# Patient Record
Sex: FEMALE | Race: BLACK OR AFRICAN AMERICAN | Employment: UNEMPLOYED | URBAN - METROPOLITAN AREA
[De-identification: names, ages, dates, MRNs, and addresses within clinical notes are randomized per-mention and may not be internally consistent; named-entity substitution may affect disease eponyms.]

---

## 2019-03-19 ENCOUNTER — HOSPITAL ENCOUNTER (INPATIENT)
Age: 84
LOS: 3 days | Discharge: HOME | DRG: 309 | End: 2019-03-22
Attending: EMERGENCY MEDICINE | Admitting: INTERNAL MEDICINE
Payer: MEDICARE

## 2019-03-19 ENCOUNTER — APPOINTMENT (OUTPATIENT)
Dept: CT IMAGING | Age: 84
DRG: 309 | End: 2019-03-19
Payer: MEDICARE

## 2019-03-19 ENCOUNTER — APPOINTMENT (OUTPATIENT)
Dept: GENERAL RADIOLOGY | Age: 84
DRG: 309 | End: 2019-03-19
Payer: MEDICARE

## 2019-03-19 DIAGNOSIS — R00.1 BRADYCARDIA: ICD-10-CM

## 2019-03-19 DIAGNOSIS — R00.1 SYMPTOMATIC BRADYCARDIA: Primary | ICD-10-CM

## 2019-03-19 DIAGNOSIS — R55 SYNCOPE, UNSPECIFIED SYNCOPE TYPE: ICD-10-CM

## 2019-03-19 LAB
ANION GAP SERPL CALCULATED.3IONS-SCNC: 11 MMOL/L (ref 3–16)
BASOPHILS ABSOLUTE: 0 K/UL (ref 0–0.2)
BASOPHILS RELATIVE PERCENT: 0.5 %
BILIRUBIN URINE: NEGATIVE
BLOOD, URINE: NEGATIVE
BUN BLDV-MCNC: 16 MG/DL (ref 7–20)
CALCIUM SERPL-MCNC: 9.6 MG/DL (ref 8.3–10.6)
CHLORIDE BLD-SCNC: 104 MMOL/L (ref 99–110)
CLARITY: CLEAR
CO2: 31 MMOL/L (ref 21–32)
COLOR: YELLOW
CREAT SERPL-MCNC: 0.7 MG/DL (ref 0.6–1.2)
EKG ATRIAL RATE: 80 BPM
EKG DIAGNOSIS: NORMAL
EKG P AXIS: 26 DEGREES
EKG P-R INTERVAL: 182 MS
EKG Q-T INTERVAL: 404 MS
EKG QRS DURATION: 118 MS
EKG QTC CALCULATION (BAZETT): 465 MS
EKG R AXIS: -35 DEGREES
EKG T AXIS: 84 DEGREES
EKG VENTRICULAR RATE: 80 BPM
EOSINOPHILS ABSOLUTE: 0.4 K/UL (ref 0–0.6)
EOSINOPHILS RELATIVE PERCENT: 6.4 %
GFR AFRICAN AMERICAN: >60
GFR NON-AFRICAN AMERICAN: >60
GLUCOSE BLD-MCNC: 138 MG/DL (ref 70–99)
GLUCOSE URINE: NEGATIVE MG/DL
HCT VFR BLD CALC: 43.9 % (ref 36–48)
HEMOGLOBIN: 14.4 G/DL (ref 12–16)
KETONES, URINE: NEGATIVE MG/DL
LEUKOCYTE ESTERASE, URINE: NEGATIVE
LYMPHOCYTES ABSOLUTE: 1.5 K/UL (ref 1–5.1)
LYMPHOCYTES RELATIVE PERCENT: 25.5 %
MAGNESIUM: 1.9 MG/DL (ref 1.8–2.4)
MCH RBC QN AUTO: 30.8 PG (ref 26–34)
MCHC RBC AUTO-ENTMCNC: 32.8 G/DL (ref 31–36)
MCV RBC AUTO: 93.8 FL (ref 80–100)
MICROSCOPIC EXAMINATION: NORMAL
MONOCYTES ABSOLUTE: 0.6 K/UL (ref 0–1.3)
MONOCYTES RELATIVE PERCENT: 9.3 %
NEUTROPHILS ABSOLUTE: 3.5 K/UL (ref 1.7–7.7)
NEUTROPHILS RELATIVE PERCENT: 58.3 %
NITRITE, URINE: NEGATIVE
PDW BLD-RTO: 13.7 % (ref 12.4–15.4)
PH UA: 7 (ref 5–8)
PLATELET # BLD: 232 K/UL (ref 135–450)
PMV BLD AUTO: 8.4 FL (ref 5–10.5)
POTASSIUM REFLEX MAGNESIUM: 3.5 MMOL/L (ref 3.5–5.1)
PROTEIN UA: NEGATIVE MG/DL
RBC # BLD: 4.68 M/UL (ref 4–5.2)
SODIUM BLD-SCNC: 146 MMOL/L (ref 136–145)
SPECIFIC GRAVITY UA: 1.01 (ref 1–1.03)
TROPONIN: <0.01 NG/ML
URINE REFLEX TO CULTURE: NORMAL
URINE TYPE: NORMAL
UROBILINOGEN, URINE: 1 E.U./DL
WBC # BLD: 6 K/UL (ref 4–11)

## 2019-03-19 PROCEDURE — 93005 ELECTROCARDIOGRAM TRACING: CPT | Performed by: PHYSICIAN ASSISTANT

## 2019-03-19 PROCEDURE — 2060000000 HC ICU INTERMEDIATE R&B

## 2019-03-19 PROCEDURE — 81003 URINALYSIS AUTO W/O SCOPE: CPT

## 2019-03-19 PROCEDURE — 99285 EMERGENCY DEPT VISIT HI MDM: CPT

## 2019-03-19 PROCEDURE — 93010 ELECTROCARDIOGRAM REPORT: CPT | Performed by: INTERNAL MEDICINE

## 2019-03-19 PROCEDURE — 83735 ASSAY OF MAGNESIUM: CPT

## 2019-03-19 PROCEDURE — 71045 X-RAY EXAM CHEST 1 VIEW: CPT

## 2019-03-19 PROCEDURE — 6370000000 HC RX 637 (ALT 250 FOR IP): Performed by: PHYSICIAN ASSISTANT

## 2019-03-19 PROCEDURE — 85025 COMPLETE CBC W/AUTO DIFF WBC: CPT

## 2019-03-19 PROCEDURE — 84484 ASSAY OF TROPONIN QUANT: CPT

## 2019-03-19 PROCEDURE — 80048 BASIC METABOLIC PNL TOTAL CA: CPT

## 2019-03-19 RX ORDER — POTASSIUM CHLORIDE 750 MG/1
10 CAPSULE, EXTENDED RELEASE ORAL 2 TIMES DAILY
COMMUNITY

## 2019-03-19 RX ORDER — HYDROCHLOROTHIAZIDE 25 MG/1
25 TABLET ORAL DAILY
Status: DISCONTINUED | OUTPATIENT
Start: 2019-03-20 | End: 2019-03-22 | Stop reason: HOSPADM

## 2019-03-19 RX ORDER — HYDROCHLOROTHIAZIDE 25 MG/1
25 TABLET ORAL ONCE
Status: COMPLETED | OUTPATIENT
Start: 2019-03-19 | End: 2019-03-19

## 2019-03-19 RX ORDER — AMLODIPINE BESYLATE 5 MG/1
5 TABLET ORAL ONCE
Status: COMPLETED | OUTPATIENT
Start: 2019-03-19 | End: 2019-03-19

## 2019-03-19 RX ORDER — MIRTAZAPINE 15 MG/1
7.5 TABLET, FILM COATED ORAL NIGHTLY
Status: CANCELLED | OUTPATIENT
Start: 2019-03-19

## 2019-03-19 RX ORDER — PRAVASTATIN SODIUM 10 MG
20 TABLET ORAL EVERY MORNING
Status: DISCONTINUED | OUTPATIENT
Start: 2019-03-20 | End: 2019-03-22 | Stop reason: HOSPADM

## 2019-03-19 RX ORDER — AMLODIPINE BESYLATE 5 MG/1
5 TABLET ORAL DAILY
Status: DISCONTINUED | OUTPATIENT
Start: 2019-03-20 | End: 2019-03-22 | Stop reason: HOSPADM

## 2019-03-19 RX ORDER — ATORVASTATIN CALCIUM 10 MG/1
20 TABLET, FILM COATED ORAL DAILY
Status: CANCELLED | OUTPATIENT
Start: 2019-03-19

## 2019-03-19 RX ORDER — ASPIRIN 81 MG/1
81 TABLET, CHEWABLE ORAL DAILY
Status: DISCONTINUED | OUTPATIENT
Start: 2019-03-20 | End: 2019-03-22 | Stop reason: HOSPADM

## 2019-03-19 RX ORDER — MIRTAZAPINE 7.5 MG/1
7.5 TABLET, FILM COATED ORAL NIGHTLY
COMMUNITY
End: 2019-03-19

## 2019-03-19 RX ADMIN — HYDROCHLOROTHIAZIDE 25 MG: 25 TABLET ORAL at 10:20

## 2019-03-19 RX ADMIN — METOPROLOL TARTRATE 25 MG: 25 TABLET ORAL at 10:20

## 2019-03-19 RX ADMIN — AMLODIPINE BESYLATE 5 MG: 5 TABLET ORAL at 10:20

## 2019-03-20 LAB — TSH SERPL DL<=0.05 MIU/L-ACNC: 1.76 UIU/ML (ref 0.27–4.2)

## 2019-03-20 PROCEDURE — 99223 1ST HOSP IP/OBS HIGH 75: CPT | Performed by: INTERNAL MEDICINE

## 2019-03-20 PROCEDURE — 6370000000 HC RX 637 (ALT 250 FOR IP): Performed by: INTERNAL MEDICINE

## 2019-03-20 PROCEDURE — 93010 ELECTROCARDIOGRAM REPORT: CPT | Performed by: INTERNAL MEDICINE

## 2019-03-20 PROCEDURE — 36415 COLL VENOUS BLD VENIPUNCTURE: CPT

## 2019-03-20 PROCEDURE — 2060000000 HC ICU INTERMEDIATE R&B

## 2019-03-20 PROCEDURE — 84443 ASSAY THYROID STIM HORMONE: CPT

## 2019-03-20 PROCEDURE — 93005 ELECTROCARDIOGRAM TRACING: CPT | Performed by: INTERNAL MEDICINE

## 2019-03-20 RX ORDER — HYDRALAZINE HYDROCHLORIDE 25 MG/1
25 TABLET, FILM COATED ORAL EVERY 8 HOURS SCHEDULED
Status: DISCONTINUED | OUTPATIENT
Start: 2019-03-20 | End: 2019-03-22 | Stop reason: HOSPADM

## 2019-03-20 RX ADMIN — PRAVASTATIN SODIUM 20 MG: 10 TABLET ORAL at 08:10

## 2019-03-20 RX ADMIN — AMLODIPINE BESYLATE 5 MG: 5 TABLET ORAL at 08:09

## 2019-03-20 RX ADMIN — ASPIRIN 81 MG 81 MG: 81 TABLET ORAL at 08:09

## 2019-03-20 RX ADMIN — HYDRALAZINE HYDROCHLORIDE 25 MG: 25 TABLET, FILM COATED ORAL at 21:26

## 2019-03-20 RX ADMIN — HYDRALAZINE HYDROCHLORIDE 25 MG: 25 TABLET, FILM COATED ORAL at 13:56

## 2019-03-20 RX ADMIN — HYDROCHLOROTHIAZIDE 25 MG: 25 TABLET ORAL at 08:10

## 2019-03-20 ASSESSMENT — PAIN SCALES - GENERAL: PAINLEVEL_OUTOF10: 0

## 2019-03-21 LAB
EKG ATRIAL RATE: 55 BPM
EKG DIAGNOSIS: NORMAL
EKG P AXIS: 40 DEGREES
EKG P-R INTERVAL: 178 MS
EKG Q-T INTERVAL: 434 MS
EKG QRS DURATION: 124 MS
EKG QTC CALCULATION (BAZETT): 415 MS
EKG R AXIS: -40 DEGREES
EKG T AXIS: 41 DEGREES
EKG VENTRICULAR RATE: 55 BPM
LV EF: 68 %
LVEF MODALITY: NORMAL

## 2019-03-21 PROCEDURE — 99232 SBSQ HOSP IP/OBS MODERATE 35: CPT | Performed by: INTERNAL MEDICINE

## 2019-03-21 PROCEDURE — 6370000000 HC RX 637 (ALT 250 FOR IP): Performed by: INTERNAL MEDICINE

## 2019-03-21 PROCEDURE — 2060000000 HC ICU INTERMEDIATE R&B

## 2019-03-21 PROCEDURE — 93306 TTE W/DOPPLER COMPLETE: CPT

## 2019-03-21 RX ADMIN — AMLODIPINE BESYLATE 5 MG: 5 TABLET ORAL at 09:16

## 2019-03-21 RX ADMIN — HYDRALAZINE HYDROCHLORIDE 25 MG: 25 TABLET, FILM COATED ORAL at 21:18

## 2019-03-21 RX ADMIN — HYDRALAZINE HYDROCHLORIDE 25 MG: 25 TABLET, FILM COATED ORAL at 13:07

## 2019-03-21 RX ADMIN — ASPIRIN 81 MG 81 MG: 81 TABLET ORAL at 09:16

## 2019-03-21 RX ADMIN — PRAVASTATIN SODIUM 20 MG: 10 TABLET ORAL at 09:22

## 2019-03-21 RX ADMIN — HYDROCHLOROTHIAZIDE 25 MG: 25 TABLET ORAL at 09:16

## 2019-03-21 RX ADMIN — HYDRALAZINE HYDROCHLORIDE 25 MG: 25 TABLET, FILM COATED ORAL at 07:03

## 2019-03-21 ASSESSMENT — PAIN SCALES - GENERAL
PAINLEVEL_OUTOF10: 0

## 2019-03-22 VITALS
OXYGEN SATURATION: 99 % | RESPIRATION RATE: 16 BRPM | BODY MASS INDEX: 32.44 KG/M2 | HEIGHT: 62 IN | SYSTOLIC BLOOD PRESSURE: 140 MMHG | TEMPERATURE: 97 F | DIASTOLIC BLOOD PRESSURE: 68 MMHG | WEIGHT: 176.28 LBS | HEART RATE: 70 BPM

## 2019-03-22 PROCEDURE — 97530 THERAPEUTIC ACTIVITIES: CPT

## 2019-03-22 PROCEDURE — 97535 SELF CARE MNGMENT TRAINING: CPT

## 2019-03-22 PROCEDURE — 97166 OT EVAL MOD COMPLEX 45 MIN: CPT

## 2019-03-22 PROCEDURE — 97530 THERAPEUTIC ACTIVITIES: CPT | Performed by: PHYSICAL THERAPIST

## 2019-03-22 PROCEDURE — 6370000000 HC RX 637 (ALT 250 FOR IP): Performed by: INTERNAL MEDICINE

## 2019-03-22 PROCEDURE — 94760 N-INVAS EAR/PLS OXIMETRY 1: CPT

## 2019-03-22 PROCEDURE — 97116 GAIT TRAINING THERAPY: CPT | Performed by: PHYSICAL THERAPIST

## 2019-03-22 PROCEDURE — 97162 PT EVAL MOD COMPLEX 30 MIN: CPT | Performed by: PHYSICAL THERAPIST

## 2019-03-22 PROCEDURE — 99232 SBSQ HOSP IP/OBS MODERATE 35: CPT | Performed by: NURSE PRACTITIONER

## 2019-03-22 RX ORDER — HYDRALAZINE HYDROCHLORIDE 25 MG/1
25 TABLET, FILM COATED ORAL EVERY 8 HOURS SCHEDULED
Qty: 90 TABLET | Refills: 1 | Status: SHIPPED | OUTPATIENT
Start: 2019-03-22

## 2019-03-22 RX ADMIN — HYDROCHLOROTHIAZIDE 25 MG: 25 TABLET ORAL at 08:30

## 2019-03-22 RX ADMIN — PRAVASTATIN SODIUM 20 MG: 10 TABLET ORAL at 08:30

## 2019-03-22 RX ADMIN — ASPIRIN 81 MG 81 MG: 81 TABLET ORAL at 08:30

## 2019-03-22 RX ADMIN — HYDRALAZINE HYDROCHLORIDE 25 MG: 25 TABLET, FILM COATED ORAL at 05:56

## 2019-03-22 RX ADMIN — AMLODIPINE BESYLATE 5 MG: 5 TABLET ORAL at 08:30

## 2019-03-22 ASSESSMENT — PAIN SCALES - GENERAL
PAINLEVEL_OUTOF10: 0
PAINLEVEL_OUTOF10: 0

## 2019-03-25 ENCOUNTER — TELEPHONE (OUTPATIENT)
Dept: CARDIOLOGY CLINIC | Age: 84
End: 2019-03-25

## 2019-05-08 ENCOUNTER — APPOINTMENT (OUTPATIENT)
Dept: CT IMAGING | Age: 84
End: 2019-05-08
Payer: MEDICARE

## 2019-05-08 ENCOUNTER — HOSPITAL ENCOUNTER (OUTPATIENT)
Age: 84
Setting detail: OBSERVATION
Discharge: HOME | End: 2019-05-11
Attending: EMERGENCY MEDICINE | Admitting: INTERNAL MEDICINE
Payer: MEDICARE

## 2019-05-08 ENCOUNTER — APPOINTMENT (OUTPATIENT)
Dept: GENERAL RADIOLOGY | Age: 84
End: 2019-05-08
Payer: MEDICARE

## 2019-05-08 DIAGNOSIS — I63.9 CEREBROVASCULAR ACCIDENT (CVA), UNSPECIFIED MECHANISM (HCC): Primary | ICD-10-CM

## 2019-05-08 LAB
A/G RATIO: 1 (ref 1.1–2.2)
ALBUMIN SERPL-MCNC: 4.3 G/DL (ref 3.4–5)
ALP BLD-CCNC: 118 U/L (ref 40–129)
ALT SERPL-CCNC: 8 U/L (ref 10–40)
ANION GAP SERPL CALCULATED.3IONS-SCNC: 11 MMOL/L (ref 3–16)
AST SERPL-CCNC: 16 U/L (ref 15–37)
BASOPHILS ABSOLUTE: 0.1 K/UL (ref 0–0.2)
BASOPHILS RELATIVE PERCENT: 0.9 %
BILIRUB SERPL-MCNC: 0.4 MG/DL (ref 0–1)
BILIRUBIN URINE: NEGATIVE
BLOOD, URINE: NEGATIVE
BUN BLDV-MCNC: 15 MG/DL (ref 7–20)
CALCIUM SERPL-MCNC: 9.8 MG/DL (ref 8.3–10.6)
CHLORIDE BLD-SCNC: 101 MMOL/L (ref 99–110)
CLARITY: CLEAR
CO2: 27 MMOL/L (ref 21–32)
COLOR: YELLOW
CREAT SERPL-MCNC: 0.8 MG/DL (ref 0.6–1.2)
EKG ATRIAL RATE: 80 BPM
EKG DIAGNOSIS: NORMAL
EKG P AXIS: 35 DEGREES
EKG P-R INTERVAL: 180 MS
EKG Q-T INTERVAL: 396 MS
EKG QRS DURATION: 114 MS
EKG QTC CALCULATION (BAZETT): 456 MS
EKG R AXIS: -28 DEGREES
EKG T AXIS: 67 DEGREES
EKG VENTRICULAR RATE: 80 BPM
EOSINOPHILS ABSOLUTE: 0.4 K/UL (ref 0–0.6)
EOSINOPHILS RELATIVE PERCENT: 6.3 %
GFR AFRICAN AMERICAN: >60
GFR NON-AFRICAN AMERICAN: >60
GLOBULIN: 4.2 G/DL
GLUCOSE BLD-MCNC: 120 MG/DL (ref 70–99)
GLUCOSE BLD-MCNC: 122 MG/DL (ref 70–99)
GLUCOSE URINE: NEGATIVE MG/DL
HCT VFR BLD CALC: 40.4 % (ref 36–48)
HEMOGLOBIN: 13.4 G/DL (ref 12–16)
INR BLD: 1.02 (ref 0.86–1.14)
KETONES, URINE: NEGATIVE MG/DL
LEUKOCYTE ESTERASE, URINE: NEGATIVE
LYMPHOCYTES ABSOLUTE: 2 K/UL (ref 1–5.1)
LYMPHOCYTES RELATIVE PERCENT: 33.1 %
MCH RBC QN AUTO: 30.7 PG (ref 26–34)
MCHC RBC AUTO-ENTMCNC: 33.1 G/DL (ref 31–36)
MCV RBC AUTO: 92.7 FL (ref 80–100)
MICROSCOPIC EXAMINATION: NORMAL
MONOCYTES ABSOLUTE: 0.6 K/UL (ref 0–1.3)
MONOCYTES RELATIVE PERCENT: 9.4 %
NEUTROPHILS ABSOLUTE: 3.1 K/UL (ref 1.7–7.7)
NEUTROPHILS RELATIVE PERCENT: 50.3 %
NITRITE, URINE: NEGATIVE
PDW BLD-RTO: 13.5 % (ref 12.4–15.4)
PERFORMED ON: ABNORMAL
PH UA: 7.5 (ref 5–8)
PLATELET # BLD: 214 K/UL (ref 135–450)
PMV BLD AUTO: 8.9 FL (ref 5–10.5)
POTASSIUM REFLEX MAGNESIUM: 3.7 MMOL/L (ref 3.5–5.1)
PROTEIN UA: NEGATIVE MG/DL
PROTHROMBIN TIME: 11.6 SEC (ref 9.8–13)
RBC # BLD: 4.36 M/UL (ref 4–5.2)
SODIUM BLD-SCNC: 139 MMOL/L (ref 136–145)
SPECIFIC GRAVITY UA: 1.02 (ref 1–1.03)
TOTAL PROTEIN: 8.5 G/DL (ref 6.4–8.2)
TROPONIN: <0.01 NG/ML
TROPONIN: <0.01 NG/ML
URINE REFLEX TO CULTURE: NORMAL
URINE TYPE: NORMAL
UROBILINOGEN, URINE: 1 E.U./DL
WBC # BLD: 6.2 K/UL (ref 4–11)

## 2019-05-08 PROCEDURE — 2580000003 HC RX 258: Performed by: INTERNAL MEDICINE

## 2019-05-08 PROCEDURE — 80053 COMPREHEN METABOLIC PANEL: CPT

## 2019-05-08 PROCEDURE — 70498 CT ANGIOGRAPHY NECK: CPT

## 2019-05-08 PROCEDURE — 93010 ELECTROCARDIOGRAM REPORT: CPT | Performed by: INTERNAL MEDICINE

## 2019-05-08 PROCEDURE — 99285 EMERGENCY DEPT VISIT HI MDM: CPT

## 2019-05-08 PROCEDURE — 70496 CT ANGIOGRAPHY HEAD: CPT

## 2019-05-08 PROCEDURE — 93005 ELECTROCARDIOGRAM TRACING: CPT | Performed by: EMERGENCY MEDICINE

## 2019-05-08 PROCEDURE — G0378 HOSPITAL OBSERVATION PER HR: HCPCS

## 2019-05-08 PROCEDURE — 70450 CT HEAD/BRAIN W/O DYE: CPT

## 2019-05-08 PROCEDURE — 81003 URINALYSIS AUTO W/O SCOPE: CPT

## 2019-05-08 PROCEDURE — 85610 PROTHROMBIN TIME: CPT

## 2019-05-08 PROCEDURE — 84484 ASSAY OF TROPONIN QUANT: CPT

## 2019-05-08 PROCEDURE — 36415 COLL VENOUS BLD VENIPUNCTURE: CPT

## 2019-05-08 PROCEDURE — 94760 N-INVAS EAR/PLS OXIMETRY 1: CPT

## 2019-05-08 PROCEDURE — 71045 X-RAY EXAM CHEST 1 VIEW: CPT

## 2019-05-08 PROCEDURE — 96372 THER/PROPH/DIAG INJ SC/IM: CPT

## 2019-05-08 PROCEDURE — 2060000000 HC ICU INTERMEDIATE R&B

## 2019-05-08 PROCEDURE — 6360000002 HC RX W HCPCS: Performed by: INTERNAL MEDICINE

## 2019-05-08 PROCEDURE — 6370000000 HC RX 637 (ALT 250 FOR IP): Performed by: INTERNAL MEDICINE

## 2019-05-08 PROCEDURE — 85025 COMPLETE CBC W/AUTO DIFF WBC: CPT

## 2019-05-08 PROCEDURE — 6360000004 HC RX CONTRAST MEDICATION: Performed by: EMERGENCY MEDICINE

## 2019-05-08 RX ORDER — ACETAMINOPHEN 80 MG
TABLET,CHEWABLE ORAL
Status: COMPLETED
Start: 2019-05-08 | End: 2019-05-08

## 2019-05-08 RX ORDER — NITROGLYCERIN 0.4 MG/1
0.4 TABLET SUBLINGUAL EVERY 5 MIN PRN
Status: ON HOLD | COMMUNITY
End: 2019-05-11 | Stop reason: HOSPADM

## 2019-05-08 RX ORDER — ASPIRIN 81 MG/1
81 TABLET ORAL DAILY
Status: DISCONTINUED | OUTPATIENT
Start: 2019-05-08 | End: 2019-05-11 | Stop reason: HOSPADM

## 2019-05-08 RX ORDER — SODIUM CHLORIDE 0.9 % (FLUSH) 0.9 %
10 SYRINGE (ML) INJECTION PRN
Status: DISCONTINUED | OUTPATIENT
Start: 2019-05-08 | End: 2019-05-11 | Stop reason: HOSPADM

## 2019-05-08 RX ORDER — ONDANSETRON 2 MG/ML
4 INJECTION INTRAMUSCULAR; INTRAVENOUS EVERY 6 HOURS PRN
Status: DISCONTINUED | OUTPATIENT
Start: 2019-05-08 | End: 2019-05-11 | Stop reason: HOSPADM

## 2019-05-08 RX ORDER — MIRTAZAPINE 7.5 MG/1
7.5 TABLET, FILM COATED ORAL NIGHTLY
COMMUNITY

## 2019-05-08 RX ORDER — MIRTAZAPINE 15 MG/1
7.5 TABLET, FILM COATED ORAL NIGHTLY
Status: DISCONTINUED | OUTPATIENT
Start: 2019-05-08 | End: 2019-05-11 | Stop reason: HOSPADM

## 2019-05-08 RX ORDER — ATORVASTATIN CALCIUM 40 MG/1
40 TABLET, FILM COATED ORAL NIGHTLY
Status: DISCONTINUED | OUTPATIENT
Start: 2019-05-08 | End: 2019-05-11 | Stop reason: HOSPADM

## 2019-05-08 RX ORDER — AMLODIPINE BESYLATE 10 MG/1
10 TABLET ORAL DAILY
COMMUNITY

## 2019-05-08 RX ORDER — POTASSIUM CHLORIDE 750 MG/1
10 TABLET, FILM COATED, EXTENDED RELEASE ORAL 2 TIMES DAILY
Status: DISCONTINUED | OUTPATIENT
Start: 2019-05-08 | End: 2019-05-11 | Stop reason: HOSPADM

## 2019-05-08 RX ORDER — SODIUM CHLORIDE 0.9 % (FLUSH) 0.9 %
10 SYRINGE (ML) INJECTION EVERY 12 HOURS SCHEDULED
Status: DISCONTINUED | OUTPATIENT
Start: 2019-05-08 | End: 2019-05-11 | Stop reason: HOSPADM

## 2019-05-08 RX ADMIN — MIRTAZAPINE 7.5 MG: 15 TABLET, FILM COATED ORAL at 21:30

## 2019-05-08 RX ADMIN — IOPAMIDOL 75 ML: 755 INJECTION, SOLUTION INTRAVENOUS at 13:01

## 2019-05-08 RX ADMIN — ASPIRIN 81 MG: 81 TABLET, COATED ORAL at 18:49

## 2019-05-08 RX ADMIN — ENOXAPARIN SODIUM 40 MG: 40 INJECTION SUBCUTANEOUS at 21:31

## 2019-05-08 RX ADMIN — Medication: at 21:36

## 2019-05-08 RX ADMIN — Medication 10 ML: at 21:32

## 2019-05-08 NOTE — ED NOTES
Dr. Francisco Boateng at bedside. Pt. Still unable to ambulate with standby assist and using walker. Dr. Francisco Boateng educating pt. On TPA risks and benefits.       Meghan Nathan RN  05/08/19 1841

## 2019-05-08 NOTE — ED NOTES
Receipt of report confirmed with receiving nurse, updates given on pt status and plan of care. RN given opportunity to ask questions and verbalized understanding.  Pt. Transported to floor via stretcher at this time     Eddie Almanzar RN  05/08/19 9968

## 2019-05-08 NOTE — ED NOTES
Bed: 02  Expected date:   Expected time:   Means of arrival: [facility]  Comments:   [facility]  05/08/19 124

## 2019-05-08 NOTE — ED NOTES
A Stroke Alert was called prior to the squad arriving to the ED at 1234. Stroke Team was called at  and they called back at [phone] and spoke with Dr. Julian Spence.       Ryan Rodriguez  05/08/19 1855

## 2019-05-08 NOTE — ED NOTES
Dr. Monse Gong,  Stroke, to assess pt.    En route per Dr. Harmony Chau report     Matilde Gore, RN  05/08/19 7038

## 2019-05-08 NOTE — H&P
HOSPITALISTS HISTORY AND PHYSICAL    5/8/2019 3:57 PM    Patient Information:  Eveline Rae is a 80 y.o. female [id number]  PCP:  Chelly Mayer (Tel: [phone] )    Chief complaint:    Chief Complaint   Patient presents with    Aphasia     in [facility]. [facility] with c/o aphasia and right sided weakness onset around 1146am. Pt. reports feeling \"weird\", dizzy, facial tingling and right sided headache. History of Present Illness:   Gail Farrell is a 80 y.o. female who presents to the emergency department with right-sided weakness. This is a 80-year-old female who presents with some right-sided weakness that started around 11:30 AM.  She states she got up to walk and it felt like her right toes were crawling on her. She also complained of a headache and states the right side of her face feels funny. According to the EMS she may also have had difficulty speaking initially.      Stroke Neurology was Consulted and tpa was offered however patient Refused. Patient Continues to have weakness of the RLE. Unable to walk. REVIEW OF SYSTEMS:   10 Point ROS was Completed and was negative unless Noted above    Past Medical History:   has a past medical history of Arthritis and Hypertension. Past Surgical History:   has a past surgical history that includes Hysterectomy and Cholecystectomy. Medications:  No current facility-administered medications on file prior to encounter. Current Outpatient Medications on File Prior to Encounter   Medication Sig Dispense Refill    amLODIPine (NORVASC) 10 MG tablet Take 10 mg by mouth daily      mirtazapine (REMERON) 7.5 MG tablet Take 7.5 mg by mouth nightly      nitroGLYCERIN (NITROSTAT) 0.4 MG SL tablet Place 0.4 mg under the tongue every 5 minutes as needed for Chest pain up to max of 3 total doses. If no relief after 1 dose, call 911.  hydrALAZINE (APRESOLINE) 25 MG tablet Take 1 tablet by mouth every 8 hours 90 tablet 1    potassium chloride (MICRO-K) 10 MEQ extended release capsule Take 10 mEq by mouth 2 times daily       pravastatin (PRAVACHOL) 20 MG tablet Take 20 mg by mouth daily      hydrochlorothiazide (HYDRODIURIL) 25 MG tablet Take 1 tablet by mouth daily. 30 tablet 3       Allergies: Allergies   Allergen Reactions    Lisinopril Swelling        Social History:  Patient Lives    reports that she has never smoked. She has never used smokeless tobacco. She reports that she does not drink alcohol or use drugs. Family History:  family history includes Cancer in her brother, brother, brother, and sister; Diabetes in her sister; Heart Disease in her mother; High Blood Pressure in her father, mother, and sister; Stroke in her brother and brother. ,     Physical Exam:  BP (!) 154/67   Pulse 72   Temp 96.9 °F (36.1 °C) (Oral)   Resp 17   Ht 5' 2\" (1.575 m)   Wt 179 lb (81.2 kg)   SpO2 95%   BMI 32.74 kg/m²     General appearance:  Appears comfortable. Well nourished  Eyes: Sclera clear, pupils equal  ENT: Moist mucus membranes, no thrush. Trachea midline. Cardiovascular: Regular rhythm, normal S1, S2. No murmur, gallop, rub. No edema in lower extremities  Respiratory: Clear to auscultation bilaterally, no wheeze, good inspiratory effort  Gastrointestinal: Abdomen soft, non-tender, not distended, normal bowel sounds  Musculoskeletal: No cyanosis in digits, neck supple  Neurology: Cranial nerves grossly intact. Alert and oriented in time, place and person. Grade 4 power in RLE  Psychiatry: Appropriate affect.  Not agitated  Skin: Warm, dry, normal turgor, no rash  Brisk capillary refill, peripheral pulses palpable   Labs:  CBC:   Lab Results   Component Value Date    WBC 6.2 05/08/2019    RBC 4.36 05/08/2019    HGB 13.4 05/08/2019    HCT 40.4 05/08/2019    MCV 92.7 05/08/2019    MCH 30.7 05/08/2019    MCHC 33.1 05/08/2019    RDW 13.5 05/08/2019     05/08/2019    MPV 8.9 05/08/2019     BMP:    Lab Results   Component Value Date     05/08/2019    K 3.7 05/08/2019     05/08/2019    CO2 27 05/08/2019    BUN 15 05/08/2019    CREATININE 0.8 05/08/2019    CALCIUM 9.8 05/08/2019    GFRAA >60 05/08/2019    LABGLOM >60 05/08/2019    GLUCOSE 122 05/08/2019     XR CHEST PORTABLE   Final Result   No acute process. CTA Head W Contrast   Final Result   No acute abnormality or flow-limiting stenosis in the major arteries of the   head and neck. CTA Neck W Contrast   Final Result   No acute abnormality or flow-limiting stenosis in the major arteries of the   head and neck. CT Head WO Contrast   Final Result   No acute intracranial abnormality. Stroke alert results were called by Dr. Zion Jackson. MD Zen to [facility] on 5/8/2019 at 13:08. Chest Xray: nothing acute  EKG:  Sinus Rythm    Problem List  Active Problems:    Acute CVA (cerebrovascular accident) [facility])  Resolved Problems:    * No resolved hospital problems. *        Assessment/Plan:   Acute CVA. Refused tpa. Continues to have deficit. Stroke pathway has been activated. Echo shows Normal EF. CTA does not show critical occlusion. Aspirin, Lipitor MRI brain. Hold BP meds For permissive Hypertension. Neurology CONSULT.     See Orders      DVT prophylaxis Lovenox  Code status Full  Diet cardiac       Lissysheyla Seay MD    5/8/2019 3:57 PM

## 2019-05-08 NOTE — CONSULTS
Stroke Team Consult Note    Asked to see this 79 yo woman urgently for possible ischemic stroke. She lives alone but has trouble with mobility and uses a walker. She hsa been getting PT. Today at 11:30 am she felt different. She can't describe it very well but she has some degree of headache, the right side of her face feels different, and she has more trouble bearing weight on her right leg. Her PMH includes HTN and arthritis. She also thinks she has had a prior TIA. On exam she was alert and attentive. She was well oriented. Her NIHSS = 1 for mild drift of the right leg (she could hold it off the bed for me). She could sit up and stand with standby assist but did not feel able to walk using a walker. Head CT reviewed, negative. CTA preliminary images reviewed, no LVO seen. Metabolic panel and CBC reviewed. Impression: It is possible she is having a left hemispheric or brainstem stroke based upon her symptoms, but this is not a certainty. Recommendation:    Her stroke scale is low. However she says there is a definite change in her ability to ambulate, and this could be disabling. For this reason I offered to treat her with tPA after reviewing potential risks, benefits, and alternatives. She declined the tPA. I would continue to observe her closely while in the tPA window. If she should worsen, please page stroke team again. She will need admission for diagnostic evaluation and therapy (could not function at home independently right now). An MRI brain will be helpful. Please consult local neurology for further guidance if desired. Stroke team will sign off, please call with questions.     Blair Coronado MD  [phone]    Total time critical care = 30 minutes

## 2019-05-08 NOTE — ED NOTES
Pharmacy Medication History Note      List of current medications patient is taking is complete. Source of information: Patient/[facility]    Changes made to medication list:  Medications flagged for removal (include reason, ex. noncompliance):  none    Medications removed (include reason, ex. therapy complete or physician discontinued):  Amlodipine- dose adjutment    Medications added/doses adjusted:  Amlodipine 10 mg daily  Mirtazapine 7.5 mg daily  Nitroglycerin SL PRN    Other notes (ex. Recent course of antibiotics, Coumadin dosing):  Denies use of other OTC or herbal medications. Last dose times updated. Gilbert Peña, PharmD  ED Pharmacist, [id number]        No current facility-administered medications on file prior to encounter. Current Outpatient Medications on File Prior to Encounter   Medication Sig Dispense Refill    amLODIPine (NORVASC) 10 MG tablet Take 10 mg by mouth daily      mirtazapine (REMERON) 7.5 MG tablet Take 7.5 mg by mouth nightly      hydrALAZINE (APRESOLINE) 25 MG tablet Take 1 tablet by mouth every 8 hours 90 tablet 1    potassium chloride (MICRO-K) 10 MEQ extended release capsule Take 10 mEq by mouth 2 times daily       pravastatin (PRAVACHOL) 20 MG tablet Take 20 mg by mouth daily      hydrochlorothiazide (HYDRODIURIL) 25 MG tablet Take 1 tablet by mouth daily. 30 tablet 3    nitroGLYCERIN (NITROSTAT) 0.4 MG SL tablet Place 0.4 mg under the tongue every 5 minutes as needed for Chest pain up to max of 3 total doses. If no relief after 1 dose, call 911. [DISCONTINUED] amLODIPine (NORVASC) 5 MG tablet Take 1 tablet by mouth daily.  60 tablet 0

## 2019-05-08 NOTE — ED NOTES
Patient assisted onto bedpan to urinate. Urine voided and leni care completed. Patient assisted to reposition to comfort on stretcher.        Chasidy Quintanilla RN  05/08/19 3224

## 2019-05-08 NOTE — ED PROVIDER NOTES
[facility]  eMERGENCY dEPARTMENTeNCOUnter      Pt Name: Paul Hargrove  MRN: [id number]  [place] 11/23/1933  Date ofevaluation: 5/8/2019  Provider: Krishan Hernández MD    CHIEF COMPLAINT       Chief Complaint   Patient presents with    Aphasia     in [facility]. [facility] with c/o aphasia and right sided weakness onset around 1146am. Pt. reports feeling \"weird\", dizzy, facial tingling and right sided headache. HISTORY OF PRESENT ILLNESS   (Location/Symptom, Timing/Onset,Context/Setting, Quality, Duration, Modifying Factors, Severity)  Note limiting factors. Paul Hargrove is a 80 y.o. female who presents to the emergency department with right-sided weakness. This is a 77-year-old female who presents with some right-sided weakness that started around 11:30 AM.  She states she got up to walk and it felt like her right toes were crawling on her. She also complained of a headache and states the right side of her face feels funny. According to the EMS she may also have had difficulty speaking initially. HPI    NursingNotes were reviewed. REVIEW OF SYSTEMS    (2-9 systems for level 4, 10 or more for level 5)     Review of Systems    General Appearance:  Alert, cooperative, no distress, appears stated age. Head:  Normocephalic, without obvious abnormality, atraumatic. Eyes:  conjunctiva/corneas clear, EOM's intact. Sclera anicteric. ENT: Mucous membranes moist.   Neck: Supple, symmetrical, trachea midline, no adenopathy. No jugular venous distention. Lungs:   No Respiratory Distress. Chest Wall:     Heart:  Regular rate rhythm with no murmurs rubs or gallops    Abdomen:   Soft and benign    Extremities:  Full range of motion. Pulses: Good throughout    Skin:  No rashes or lesions to exposed skin. Neurologic: Alert and oriented X 3. Motor grossly normal.  Speech clear. When I initially examined the patient there is no aphasia.   She had Years of education: None    Highest education level: None   Occupational History    None   Social Needs    Financial resource strain: None    Food insecurity:     Worry: None     Inability: None    Transportation needs:     Medical: None     Non-medical: None   Tobacco Use    Smoking status: Never Smoker    Smokeless tobacco: Never Used   Substance and Sexual Activity    Alcohol use: No    Drug use: No    Sexual activity: None   Lifestyle    Physical activity:     Days per week: None     Minutes per session: None    Stress: None   Relationships    Social connections:     Talks on phone: None     Gets together: None     Attends Yazdanism service: None     Active member of club or organization: None     Attends meetings of clubs or organizations: None     Relationship status: None    Intimate partner violence:     Fear of current or ex partner: None     Emotionally abused: None     Physically abused: None     Forced sexual activity: None   Other Topics Concern    None   Social History Narrative    None       SCREENINGS   NIH Stroke Scale  Interval: Baseline  Level of Consciousness (1a. ): Alert  LOC Questions (1b. ):  Answers both correctly  LOC Commands (1c. ): Obeys both correctly  Best Gaze (2. ): Normal  Visual (3. ): No visual loss  Facial Palsy (4. ): (!) Minor(improving, but slight right sided droop noted)  Motor Arm, Left (5a. ): No drift  Motor Arm, Right (5b. ): Drift, but does not hit bed  Motor Leg, Left (6a. ): No drift  Motor Leg, Right (6b. ): Drift, but does not hit bed  Limb Ataxia (7. ): Absent  Sensory (8. ): Normal  Best Language (9. ): No aphasia  Dysarthria (10. ): Normal  Extinction and Inattention (11): No neglect  Total: 3         PHYSICAL EXAM    (up to 7 for level 4, 8 or more for level 5)     ED Triage Vitals [05/08/19 1308]   BP Temp Temp Source Pulse Resp SpO2 Height Weight   (!) 153/72 96.9 °F (36.1 °C) Oral 103 14 99 % 5' 2\" (1.575 m) 179 lb (81.2 kg)       Physical Exam    DIAGNOSTIC RESULTS     EKG: All EKG's are interpreted by the Emergency Department Physician who either signs or Co-signsthis chart in the absence of a cardiologist.    Sinus rhythm at a rate of 80 beats a minute with no acute ST elevations or depressions or pathologic Q waves. RADIOLOGY:   Non-plain filmimages such as CT, Ultrasound and MRI are read by the radiologist. Plain radiographic images are visualized and preliminarily interpreted by the emergency physician with the below findings:    See below    Interpretation per the Radiologist below, if available at the time ofthis note:    XR CHEST PORTABLE   Final Result   No acute process. CTA Head W Contrast   Final Result   No acute abnormality or flow-limiting stenosis in the major arteries of the   head and neck. CTA Neck W Contrast   Final Result   No acute abnormality or flow-limiting stenosis in the major arteries of the   head and neck. CT Head WO Contrast   Final Result   No acute intracranial abnormality. Stroke alert results were called by Dr. Lala Berry. MD Zen to Phan [place] on 5/8/2019 at 13:08.                ED BEDSIDE ULTRASOUND:   Performed by ED Physician - none    LABS:  Labs Reviewed   COMPREHENSIVE METABOLIC PANEL W/ REFLEX TO MG FOR LOW K - Abnormal; Notable for the following components:       Result Value    Glucose 122 (*)     Total Protein 8.5 (*)     Albumin/Globulin Ratio 1.0 (*)     ALT 8 (*)     All other components within normal limits    Narrative:     Performed at:  [facility] [address], [place], [address] [place] [address]   Phone [phone]   POCT GLUCOSE - Abnormal; Notable for the following components:    POC Glucose 120 (*)     All other components within normal limits    Narrative:     Performed at:  [facility] [address], [place], [facility] [place]   Phone [phone]   CBC WITH AUTO DIFFERENTIAL    Narrative: Performed at:  [facility]  [address] [facility], [address]   Phone [phone]   PROTIME-INR    Narrative:     Performed at:  [facility]  [address] [facility], [address]   Phone [phone]   URINE RT REFLEX TO CULTURE    Narrative:     Performed at:  [facility]  [address] [facility], [address]   Phone [phone]       All other labs were within normal range or not returned as of this dictation. EMERGENCY DEPARTMENT COURSE and DIFFERENTIAL DIAGNOSIS/MDM:   Vitals:    Vitals:    05/08/19 1308 05/08/19 1315 05/08/19 1330 05/08/19 1430   BP: (!) 153/72 (!) 173/77 (!) 150/68 (!) 179/66   Pulse: 103 101 90 80   Resp: 14 20 14 19   Temp: 96.9 °F (36.1 °C)      TempSrc: Oral      SpO2: 99% 98% 97% 95%   Weight: 179 lb (81.2 kg)      Height: 5' 2\" (1.575 m)          The patient has remained stable throughout her hospital course. On initial examination the patient only had some right lower extremity weakness. She did hold her leg off the bed more than 5 seconds. I consult did the  stroke team (Dr. Giovanni Goode) who actually came in to evaluate the patient. Uvula the patient's symptoms were mild and improving she was offered TPA but has declined. On his examination the patient was able to walk with her walker. The patient be admitted for a CVA. She is in stable condition. MDM      REASSESSMENT          CRITICAL CARE TIME   Total Critical Care time was 40 minutes, excluding separatelyreportable procedures. There was a high probability ofclinically significant/life threatening deterioration in the patient's condition which required my urgent intervention. CONSULTS:  IP CONSULT TO HOSPITALIST    PROCEDURES:  Unless otherwise noted below, none     Procedures    FINAL IMPRESSION      1.  Cerebrovascular accident (CVA), unspecified mechanism ([facility] [address]          DISPOSITION/PLAN   DISPOSITION

## 2019-05-09 ENCOUNTER — APPOINTMENT (OUTPATIENT)
Dept: MRI IMAGING | Age: 84
End: 2019-05-09
Payer: MEDICARE

## 2019-05-09 LAB
CHOLESTEROL, TOTAL: 119 MG/DL (ref 0–199)
HDLC SERPL-MCNC: 41 MG/DL (ref 40–60)
LDL CHOLESTEROL CALCULATED: 64 MG/DL
TRIGL SERPL-MCNC: 69 MG/DL (ref 0–150)
VLDLC SERPL CALC-MCNC: 14 MG/DL

## 2019-05-09 PROCEDURE — 2060000000 HC ICU INTERMEDIATE R&B

## 2019-05-09 PROCEDURE — G0378 HOSPITAL OBSERVATION PER HR: HCPCS

## 2019-05-09 PROCEDURE — 80061 LIPID PANEL: CPT

## 2019-05-09 PROCEDURE — 2580000003 HC RX 258: Performed by: INTERNAL MEDICINE

## 2019-05-09 PROCEDURE — 97535 SELF CARE MNGMENT TRAINING: CPT

## 2019-05-09 PROCEDURE — 6360000002 HC RX W HCPCS: Performed by: INTERNAL MEDICINE

## 2019-05-09 PROCEDURE — 99223 1ST HOSP IP/OBS HIGH 75: CPT | Performed by: PSYCHIATRY & NEUROLOGY

## 2019-05-09 PROCEDURE — 97161 PT EVAL LOW COMPLEX 20 MIN: CPT

## 2019-05-09 PROCEDURE — 97530 THERAPEUTIC ACTIVITIES: CPT

## 2019-05-09 PROCEDURE — 97165 OT EVAL LOW COMPLEX 30 MIN: CPT

## 2019-05-09 PROCEDURE — 6370000000 HC RX 637 (ALT 250 FOR IP): Performed by: INTERNAL MEDICINE

## 2019-05-09 PROCEDURE — 92610 EVALUATE SWALLOWING FUNCTION: CPT

## 2019-05-09 PROCEDURE — 92526 ORAL FUNCTION THERAPY: CPT

## 2019-05-09 PROCEDURE — 36415 COLL VENOUS BLD VENIPUNCTURE: CPT

## 2019-05-09 PROCEDURE — 96372 THER/PROPH/DIAG INJ SC/IM: CPT

## 2019-05-09 PROCEDURE — 70551 MRI BRAIN STEM W/O DYE: CPT

## 2019-05-09 RX ADMIN — MIRTAZAPINE 7.5 MG: 15 TABLET, FILM COATED ORAL at 23:10

## 2019-05-09 RX ADMIN — Medication 10 ML: at 10:30

## 2019-05-09 RX ADMIN — DESMOPRESSIN ACETATE 40 MG: 0.2 TABLET ORAL at 23:11

## 2019-05-09 RX ADMIN — ENOXAPARIN SODIUM 40 MG: 40 INJECTION SUBCUTANEOUS at 23:10

## 2019-05-09 RX ADMIN — ASPIRIN 81 MG: 81 TABLET, COATED ORAL at 10:28

## 2019-05-09 RX ADMIN — POTASSIUM CHLORIDE 10 MEQ: 750 TABLET, FILM COATED, EXTENDED RELEASE ORAL at 10:28

## 2019-05-09 RX ADMIN — POTASSIUM CHLORIDE 10 MEQ: 750 TABLET, FILM COATED, EXTENDED RELEASE ORAL at 23:10

## 2019-05-09 RX ADMIN — Medication 10 ML: at 23:11

## 2019-05-09 ASSESSMENT — PAIN SCALES - GENERAL: PAINLEVEL_OUTOF10: 0

## 2019-05-09 NOTE — FLOWSHEET NOTE
Pt's heartrate down to 38 at 2103; monitor tech notified RN. Sanjeev Santos RN at bedside; pt asymptomatic. Pt's heartrate down to 38 at 0037; monitor tech notified RN. Russ RN at bedside; pt asymptomatic. VSS overnight with missed beats at 0135 and 0138 and HR down to 49 approx 0400. Pt remains asymptomatic with each episode. All telemetry strips placed on chart & d/w Coby Benavidez RN at shift change in AM. Coby stevens d/w MD. Pt may benefit from a cardiology consult.

## 2019-05-09 NOTE — PROGRESS NOTES
Warm, dry, normal turgor  Extremity exam shows brisk capillary refill. Peripheral pulses are palpable in lower extremities     Labs and Tests:  CBC:   Recent Labs     05/08/19  1251   WBC 6.2   HGB 13.4        BMP:  Recent Labs     05/08/19  1251      K 3.7      CO2 27   BUN 15   CREATININE 0.8   GLUCOSE 122*     Hepatic: Recent Labs     05/08/19  1251   AST 16   ALT 8*   BILITOT 0.4   ALKPHOS 118     XR CHEST PORTABLE   Final Result   No acute process. CTA Head W Contrast   Final Result   No acute abnormality or flow-limiting stenosis in the major arteries of the   head and neck. CTA Neck W Contrast   Final Result   No acute abnormality or flow-limiting stenosis in the major arteries of the   head and neck. CT Head WO Contrast   Final Result   No acute intracranial abnormality. Stroke alert results were called by Dr. Viktor Marshall. MD Zen to Basia Patel on 5/8/2019 at 13:08. MRI brain without contrast    (Results Pending)         Problem List  Active Problems:    Acute CVA (cerebrovascular accident) ([facility] [address]  Resolved Problems:    * No resolved hospital problems. *       Assessment & Plan:     Acute CVA. Refused tpa. Continues to have deficit. Stroke pathway has been activated. Echo shows Normal EF. CTA does not show critical occlusion. Aspirin, Lipitor MRI brain.     Hold BP meds For permissive Hypertension.      Physical therapy. PMR consult,       Diet: DIET CARDIAC;  Dysphagia III Advanced  Code:Full Code  DVT PPX Lovenox  Disposition  Will need rehab       Dale Ordoñez MD   5/9/2019 10:32 AM

## 2019-05-09 NOTE — PLAN OF CARE
Problem: Cardiovascular  Goal: Hemodynamic stability  Outcome: Ongoing   Pt in bed overnight, denied pain, VSS with /78 at 0020. See all flowsheets. Will continue to monitor.

## 2019-05-09 NOTE — PROGRESS NOTES
[address] [phone] was active with this pt prior to admit. Discharge planner notified. Will follow.

## 2019-05-09 NOTE — PROGRESS NOTES
2nd phone call to daughter to get MRI questionnaire completed. Pt unable to answer questions. Will continue to attempt.

## 2019-05-09 NOTE — CONSULTS
NEUROLOGY CONSULTATION     Patient's Name :   Kelly Everett        YOB: 1933                    Date of Consultation : 5/9/2019 5:19 PM    Referring Physician :  Esau Cabrera MD    Reason for Consultation :    Transient right leg weakness    HISTORY OF PRESENT ILLNESS :    Kelly Everett is a 80 y.o. female   History was obtained from patient and from dictations in the chart. Additional history was obtained from the patient's nurse. Patient came to the emergency room was complains of right leg weakness. She felt that when she try to get up to walk it felt as if her right sided toes were crawling on her. She also complained of a headache. She felt that her face felt funny. Denied any weakness in the right arm. Symptoms of completely resolved. Patient states that she has back problems and has symptoms like this on and off. Onset was acute abrupt and moderately severe. No clear aggravating or relieving factors. REVIEW OF SYSTEMS  Denies any chest pain palpitations breathlessness abdominal pain fever or weight loss. Rest of the 14 system review was reviewed and was negative except for the symptoms noted above. Past Medical History:   Diagnosis Date    Arthritis     Hypertension      Family History   Problem Relation Age of Onset    High Blood Pressure Mother     Heart Disease Mother     High Blood Pressure Father     Cancer Sister     High Blood Pressure Sister     Diabetes Sister     Cancer Brother     Cancer Brother     Cancer Brother     Stroke Brother     Stroke Brother      Social History     Socioeconomic History    Marital status:       Spouse name: None    Number of children: None    Years of education: None    Highest education level: None   Occupational History    None   Social Needs    Financial resource strain: None    Food insecurity:     Worry: None     Inability: None  Transportation needs:     Medical: None     Non-medical: None   Tobacco Use    Smoking status: Never Smoker    Smokeless tobacco: Never Used   Substance and Sexual Activity    Alcohol use: No    Drug use: No    Sexual activity: None   Lifestyle    Physical activity:     Days per week: None     Minutes per session: None    Stress: None   Relationships    Social connections:     Talks on phone: None     Gets together: None     Attends Bahai service: None     Active member of club or organization: None     Attends meetings of clubs or organizations: None     Relationship status: None    Intimate partner violence:     Fear of current or ex partner: None     Emotionally abused: None     Physically abused: None     Forced sexual activity: None   Other Topics Concern    None   Social History Narrative    None     Current Facility-Administered Medications   Medication Dose Route Frequency Provider Last Rate Last Dose    sodium chloride flush 0.9 % injection 10 mL  10 mL Intravenous 2 times per day Abundio Jordan MD   10 mL at 05/09/19 1030    sodium chloride flush 0.9 % injection 10 mL  10 mL Intravenous PRN Theron Calvillo MD        magnesium hydroxide (MILK OF MAGNESIA) 400 MG/5ML suspension 30 mL  30 mL Oral Daily PRN Theron Calvillo MD        ondansetron (ZOFRAN) injection 4 mg  4 mg Intravenous Q6H PRN Theron aClvillo MD        enoxaparin (LOVENOX) injection 40 mg  40 mg Subcutaneous Nightly Theron Whitfield MD   40 mg at 05/08/19 2131    aspirin EC tablet 81 mg  81 mg Oral Daily Theron Whitfield MD   81 mg at 05/09/19 1028    atorvastatin (LIPITOR) tablet 40 mg  40 mg Oral Nightly Theron Whitfield MD        mirtazapine (REMERON) tablet 7.5 mg  7.5 mg Oral Nightly Theron Whitfield MD   7.5 mg at 05/08/19 2130    potassium chloride (KLOR-CON) extended release tablet 10 mEq  10 mEq Oral BID Abundio Jordan MD   10 mEq at 05/09/19 1028     Allergies   Allergen Reactions    Lisinopril Swelling       PHYSICAL EXAMINATION:  BP (!) 149/86   Pulse 88   Temp 98.1 °F (36.7 °C) (Temporal)   Resp 14   Ht 5' 2\" (1.575 m)   Wt 179 lb (81.2 kg)   SpO2 99%   BMI 32.74 kg/m²   Appearance: Well appearing, well nourished and in no distress  Mental Status Exam: Patient is alert, oriented to person, place and time. Recent and remote memory is normal  Fund of Knowledge is normal  Attention/concentration is normal.   Speech : No dysarthria  Language : No aphasia  Funduscopic Exam: sharp disc margins  Cranial Nerves:   II: Visual fields:  Full to confrontation  III: Pupils:  equal, round, reactive to light  III,IV,VI: Extra Ocular Movements are intact. No nystagmus  V: Facial sensation is intact to pin prick and light touch  VII: Facial strength and movements: intact and symmetric smile,cheek puffing and eyebrow elevation  VIII: Hearing:  Intact to finger rub bilaterally  IX: Palate  elevation is symmetric  XI: Shoulder shrug is intact  XII: Tongue movements are normal  Motor:  Muscle tone and bulk are normal.   Strength is symmetrical 4+ /5 in all four extremities. Sensory: Intact to light touch and  pin prick in all four extremities  Coordination:  Normal  Finger to Nose and Heel to Shin bilaterally    . Reflexes:  DTR + 1 and symmetric bilaterally  Plantar response: Flexor bilaterally  Gait: Gait is impaired Romberg: negative  Vascular: No carotid bruit bilaterally        DATA:  LABS:  General Labs:    CBC:   Lab Results   Component Value Date    WBC 6.2 05/08/2019    RBC 4.36 05/08/2019    HGB 13.4 05/08/2019    HCT 40.4 05/08/2019    MCV 92.7 05/08/2019    MCH 30.7 05/08/2019    MCHC 33.1 05/08/2019    RDW 13.5 05/08/2019     05/08/2019    MPV 8.9 05/08/2019     BMP:    Lab Results   Component Value Date     05/08/2019    K 3.7 05/08/2019     05/08/2019    CO2 27 05/08/2019    BUN 15 05/08/2019    LABALBU 4.3 05/08/2019    CREATININE 0.8 05/08/2019    CALCIUM 9.8 05/08/2019    GFRAA >60 05/08/2019    LABGLOM >60 05/08/2019    GLUCOSE 122 05/08/2019     RADIOLOGY REVIEW:  I have reviewed radiology image(s) of:  MRI brain    IMPRESSION :  Right leg weakness, transient with low back pain  I suspect this could be more from a lumbar radiculopathy. TIA was considered in the differential diagnosis. MRI brain scan was just completed. I reviewed some of the images and there is no evidence of acute stroke but a final report is pending. CT angiogram did not show any significant vascular disease  Patient Active Problem List   Diagnosis    Angioedema    Bradycardia    Fracture of humerus, left, closed    Left shoulder pain    Syncope    Essential hypertension    Acute CVA (cerebrovascular accident) [facility])     RECOMMENDATIONS :  Discussed with patient  Discussed with patient's nurse  Discussed with hospitalist physician  I will get an MRI lumbar spine as well  Await MRI brain results  Continue aspirin        Please note a portion of this chart was generated using dragon dictation software. Although every effort was made to ensure the accuracy of this automated transcription, some errors in transcription may have occurred.

## 2019-05-09 NOTE — PROGRESS NOTES
CLINICAL BEDSIDE SWALLOWING EVALUATION  Speech Therapy Department    Patient Name:  Latanya Colunga  :  1933  Pain level: Pt did not report pain  Medical Diagnosis:   Acute CVA (cerebrovascular accident) ([facility] [address] [I63.9]  Acute CVA (cerebrovascular accident) ([facility] [address] [I63.9]     HPI: Per chart: Mckinley Bansal a 80 y. o. female who presents to the emergency department with right-sided weakness.  This is a 49-year-old female who presents with some right-sided weakness that started around 11:30 AM.  She states she got up to walk and it felt like her right toes were crawling on her. Shazia Teague also complained of a headache and states the right side of her face feels funny.  According to the EMS she may also have had difficulty speaking initially.      Stroke Neurology was Consulted and tpa was offered however patient Refused. Patient Continues to have weakness of the RLE. Unable to walk. Bedside swallow evaluation orders initially received, therefore bedside swallow evaluation only completed. Pt presents with intermittent reduced speech intelligibility during assessment. Pt reported some changes with her speech over the past few weeks, however attributed it to whether she is \"talking to someone who brings her drake\" or not. RN notified of request for \"SLP Eval and Treat\" orders. Order received following evaluation, therefore will complete full speech-language evaluation as schedule allows. Treatment Diagnosis:  Mild Oropharyngeal Dysphagia    Impressions: Pt was seen sitting upright in bed, she reported occasional sensation of food sticking. Pt consumes a regular texture diet with thin liquids at home. Various textures were provided to assess swallowing function. Pt presents with mild oropharyngeal dysphagia characterized by prolonged mastication, mildly delayed swallow initiation, reduced laryngeal elevation upon manual palpation, and intermittent \"effortful\" swallow.   Thin liquids via cup and straw revealed mildly delayed swallow initiation, intermittently effortful swallow was noted. No overt clinical s/s of aspiration/penetration were assessed with thin liquids via cup/straw. Mildly prolonged mastication was noted with hard solids with Pt intermittently reporting sensation of \"food sticking\" and requiring liquid wash to assist with clearance. At this time, a Dysphagia III diet with thin liquids is recommended with ongoing use of aspiration precautions to reduce overall risk. Dietary Recommendations: Dysphagia III (advanced) diet with thin liquids, meds as tolerated    Strategies: 90 degree positioning with all p.o. intake; small bites/sips; alternate textures through meal; reduce rate of intake    Treatment/Goals: Speech therapy for dysphagia tx 3-5 times per week. ST.) Pt will tolerate recommended diet without s/s of aspiration   2.) If clinical symptoms of penetration/aspiration continue to be noted,Pt will tolerate MBS to r/o aspiration and determine appropriate diet/liquid level. 3.) Pt will tolerate diet advance to least restricted diet, as clinically indicated, with no signs of aspiration     Oral motor Exam:  Dentition: upper dentures, missing some lower teeth  Labial/Facial: within functional limits   Lingual: within functional limits  Voice: within functional limits    Oral Phase:   Mildly prolonged mastication    Pharyngeal Phase:  Mildly delayed swallow initiation  Decreased laryngeal elevation via palpation   Effortful swallow    Patient/Family Education:Education, results and recommendations given to the Pt and nurse, who verbalized understanding    Timed Code Treatment: 0 minutes    Total Treatment Time: 25 minutes    Discharge Recommendations: Speech Therapy for Speech/Dysphagia treatment at discharge.     Alex Sutton M.A., [address] [place] [address]  Speech-Language Pathologist

## 2019-05-09 NOTE — PROGRESS NOTES
Occupational Therapy   Occupational Therapy Initial Assessment  Date: 2019   Patient Name: Kelly Everett  MRN: [id number]     : 1933    Date of Service: 2019    Discharge Recommendations:Anya Muñoz scored a 16/24 on the AM-PAC ADL Inpatient form. Current research shows that an AM-PAC score of 17 or less is typically not associated with a discharge to the patient's home setting. Based on the patients AM-PAC score and their current ADL deficits, it is recommended that the patient have 5-7 sessions per week of Occupational Therapy at d/c to increase the patients independence. OT Equipment Recommendations  Equipment Needed: No  Other: Continue to assess based on progress, recommend bsc, shower chair, RW    Assessment   Performance deficits / Impairments: Decreased functional mobility ; Decreased ADL status; Decreased strength;Decreased endurance;Decreased balance;Decreased high-level IADLs  Assessment: Patient presents with the above deficits, which are below baseline, and would benefit from continued skilled OT to address  Treatment Diagnosis: Decreased ADls, IADLs, transfers, mobility associated with Acute CVA  Prognosis: Good  Decision Making: Low Complexity  Patient Education: Eval, POC, discharge recommendations  REQUIRES OT FOLLOW UP: Yes  Activity Tolerance  Activity Tolerance: Patient Tolerated treatment well  Safety Devices  Safety Devices in place: Yes  Type of devices: All fall risk precautions in place;Call light within reach; Chair alarm in place;Gait belt;Patient at risk for falls; Left in chair;Nurse notified           Patient Diagnosis(es): The encounter diagnosis was Cerebrovascular accident (CVA), unspecified mechanism ([facility] [address] has a past medical history of Arthritis and Hypertension. has a past surgical history that includes Hysterectomy and Cholecystectomy.     Treatment Diagnosis: Decreased ADls, IADLs, transfers, mobility associated with Acute CVA      Restrictions  Restrictions/Precautions  Restrictions/Precautions: Fall Risk(Medium)    Subjective   General  Chart Reviewed: Yes  Family / Caregiver Present: No  Diagnosis: Acute CVA  Subjective  Subjective: Patient supine in bed upon arrival  Oxygen Therapy  SpO2: 99 %  Pulse Oximeter Device Mode: Intermittent  Pulse Oximeter Device Location: Finger  O2 Device: None (Room air)  Social/Functional History  Social/Functional History  Lives With: (has family stay overnight as needed when dizzy or scared for safety)  Type of Home: Apartment  Home Layout: One level  Bathroom Shower/Tub: Walk-in shower  Bathroom Toilet: Handicap height  Bathroom Equipment: Shower chair, Hand-held shower  Bathroom Accessibility: Accessible  Home Equipment: 4 wheeled walker, [employer] Help From: Family  ADL Assistance: Needs assistance(pt stated independent bathing. a few minutes later explained someone gives her a shower 2x weekly & dtr does sponge bath other days.)  Homemaking Assistance: Needs assistance  Meal Prep: Maximal  Laundry: (independent)  Vacuuming: Maximal  Cleaning: Maximal  Driving: Total  Shopping:  Total(kids do shopping)  Homemaking Responsibilities: Yes  Ambulation Assistance: Needs assistance(walker only when feeling weak)  Transfer Assistance: Independent  Active : No  Patient's  Info: kids drive various cars, Ap [place]man hard to step into  Occupation: Retired  Type of occupation: nursing,   Leisure & Hobbies: read, tv, lore, Rastafari meetings  Additional Comments: no falls in last 6 months       Objective   Vision: Within Functional Limits(glasses for reading)  Hearing: Within functional limits    Orientation  Overall Orientation Status: Within Functional Limits     Balance  Sitting Balance: Supervision  Standing Balance: Minimal assistance(CGA to Roxy (increased time with RW))  Standing Balance  Time: ~8-10 minutes total  Activity: increased time during functional mobility with RW (with RN permission, removed external female catheter and replaced with depend following periarea hygiene) and stance at sink for ADLs  Toilet Transfers  Toilet - Technique: Ambulating  Equipment Used: Standard toilet  Toilet Transfer: Contact guard assistance  Wheelchair Bed Transfers  Wheelchair/Bed - Technique: Ambulating  Equipment Used: Bed;Other  Level of Asssistance: Contact guard assistance;Minimal assistance  ADL  Feeding: Setup  Grooming: Setup;Contact guard assistance(In stance at sink, CGA for balance)  UE Bathing: Setup;Minimal assistance  LE Bathing: Setup;Minimal assistance; Moderate assistance(to reach feet)  UE Dressing: Stand by assistance  LE Dressing: Moderate assistance(threading depends)  Toileting: Minimal assistance; Moderate assistance  Additional Comments: Grooming and UB/LB bathing at sink, LB dressing seated  Tone RUE  RUE Tone: Normotonic  Tone LUE  LUE Tone: Normotonic  Coordination  Movements Are Fluid And Coordinated: Yes  Quality of Movement Other  Comment: Patient with previous shoulder/humeral fx, decreased ROM L shoulder     Bed mobility  Supine to Sit: Stand by assistance  Scooting: Stand by assistance  Transfers  Sit to stand: Contact guard assistance  Stand to sit: Contact guard assistance  Vision - Basic Assessment  Patient Visual Report: No visual complaint reported. Cognition  Overall Cognitive Status: Exceptions  Arousal/Alertness: Delayed responses to stimuli  Following Commands: Follows one step commands with increased time; Follows one step commands with repetition  Memory: Decreased recall of recent events  Initiation: Requires cues for some  Sequencing: Requires cues for some  Perception  Overall Perceptual Status: WFL     Sensation  Overall Sensation Status: WFL        LUE PROM (degrees)  LUE PROM: WFL  LUE General PROM: elbow AROM WFL, shoulder AAROM  RUE AROM (degrees)  RUE AROM : WFL  LUE Strength  L Hand Grasp: 4/5                   Plan   Plan  Times per week: 5-7  Times per day: Daily  Current Treatment Recommendations: Strengthening, Balance Training, Functional Mobility Training, Patient/Caregiver Education & Training, Equipment Evaluation, Education, & procurement, Home Management Training, Self-Care / ADL      AM-PAC Score        AM-PAC Inpatient Daily Activity Raw Score: 16  AM-PAC Inpatient ADL T-Scale Score : 35.96  ADL Inpatient CMS 0-100% Score: 53.32  ADL Inpatient CMS G-Code Modifier : CK    Goals  Short term goals  Time Frame for Short term goals: Discharge   Short term goal 1: Patient will bathe supervision  Short term goal 2: Patient will dress UB Roxy, LB modA  Short term goal 3: Patient will perform functional ADL transfers CGA to [address] term goal 4: Patient will perform functional mobility SBA with appropriate AD  Long term goals  Time Frame for Long term goals : LTG=STG  Patient Goals   Patient goals : Home       Therapy Time   Individual Concurrent Group Co-treatment   Time In 3120         Time Out 1115         Minutes 60              Timed Code Treatment Minutes:   45    Total Treatment Minutes:  [address], [address]

## 2019-05-09 NOTE — PROGRESS NOTES
Physical Therapy    Facility/Department: [address]  Initial Assessment    NAME: Jeromy Mena  : 1933  MRN: [id number]    Date of Service: 2019    Discharge Recommendations: Jeromy Mena scored a 16/24 on the AM-PAC short mobility form. Current research shows that an AM-PAC score of 17 or less is typically not associated with a discharge to the patient's home setting. Based on the patients AM-PAC score and their current functional mobility deficits, it is recommended that the patient have 5-7 sessions per week of Physical Therapy at d/c to increase the patients independence. PT Equipment Recommendations  Equipment Needed: No  Other: continue to assess need for RW     Assessment   Body structures, Functions, Activity limitations: Decreased functional mobility ; Decreased safe awareness;Decreased balance  Assessment: Patient presents with the above deficits and is not a candidate for return home at this time due to risk of falls and lack of 24 supervision. Patient's functional mobility is not at baseline status and patient could benefit from further skilled PT in the acute and subacute setting in order to return to PLOF and maximize independence. Patient is agreeable to further rehabilitation in the inpatient setting following min encouragement. Specific instructions for Next Treatment: further assessment of standing balance   Prognosis: Good  Decision Making: Low Complexity  Patient Education: PT/OT EVELIO nagel   Barriers to Learning: none identified   REQUIRES PT FOLLOW UP: Yes  Activity Tolerance  Activity Tolerance: Patient Tolerated treatment well;Patient limited by fatigue  Activity Tolerance: Further assessment of balance was deferred due to fatigue. Patient Diagnosis(es): The encounter diagnosis was Cerebrovascular accident (CVA), unspecified mechanism ([address] has a past medical history of Arthritis and Hypertension.    has a past surgical history that includes Hysterectomy and Cholecystectomy. Restrictions  Restrictions/Precautions  Restrictions/Precautions: Fall Risk, Modified Diet(medium fall risk, cardiac diet )  Required Braces or Orthoses?: No  Position Activity Restriction  Other position/activity restrictions: Patient presented to the ED 5/8 with aphasia, R sided weakness, dizziness, R sided headache, and n/t in face. Vision/Hearing  Vision: Within Functional Limits  Hearing: Within functional limits     Subjective  General  Chart Reviewed: Yes  Patient assessed for rehabilitation services?: Yes  Response To Previous Treatment: Not applicable  Family / Caregiver Present: No  Diagnosis: acute CVA   Follows Commands: Within Functional Limits  General Comment  Comments: patient found supine in bed   Subjective  Subjective: patient agreeable to PT/OT eval. patient pleasant, appropriately conversational, and soft-spoken. Minimally tearful when talking about medical condition and discharge plans. Pain Screening  Patient Currently in Pain: Denies  Vital Signs  Patient Currently in Pain: Denies       Orientation  Orientation  Overall Orientation Status: Within Functional Limits  Social/Functional History  Social/Functional History  Lives With: Alone(family sometimes spends the night when patient feels dizzy. no availability for 24 hour supervision.  Pt has four daughters. )  Type of Home: Apartment  Home Layout: One level  Home Access: Level entry  Bathroom Shower/Tub: Walk-in shower  Bathroom Toilet: Handicap height  Bathroom Equipment: Shower chair, Hand-held shower  Bathroom Accessibility: Accessible  Home Equipment: 4 wheeled walker, [place] [employer] Help From: Family, Home health  ADL Assistance: Needs assistance(Daughter helps with bathing on Tuesdays and Thursdays, patient does independent sponge baths other days. )  Homemaking Assistance: Needs assistance  Meal Prep: Maximal(Meals on Wheels, occassional cooking)  Laundry: (independent)  Vacuuming: Maximal  Cleaning: Maximal  Driving: Total  Shopping: (children do grocery shopping)  Homemaking Responsibilities: Yes  Ambulation Assistance: Needs assistance(walker only when feeling weak)  Transfer Assistance: Independent  Active : No  Patient's  Info: kids drive various cars, Elsa stevens hard to step into  Occupation: Retired  Type of occupation: nursing  Leisure & Hobbies: read, tv, lore, [employer] meetings  Additional Comments: denies falls in the past six months. patient uses cane and walls/furniture for balance support when she is not using rollator. patient may be a questionable historian, some reports of assist levels varied through interview. Objective     Observation/Palpation  Edema: patient reports LE edema has decreased since admission     AROM RLE (degrees)  RLE AROM: WFL  AROM LLE (degrees)  LLE AROM : WFL  Strength RLE  Strength RLE: WFL  Comment: formally tested; globally 5/5; no significant side by side differences noted at time of testing. Strength LLE  Strength LLE: WFL  Comment: formally tested; globally 5/5; no significant side by side differences noted at time of testing. Motor Control  Gross Motor?: WFL  Coordination  Rapid Alternating Movements: Normal  Heel to Shin: Normal  Sensation  Overall Sensation Status: WFL  Bed mobility  Supine to Sit: Stand by assistance  Scooting: Stand by assistance  Transfers  Sit to Stand: Stand by assistance(from EOB, from toilet )  Stand to sit: Stand by assistance(to recliner, to toilet. patient fearful with controlled descent to toilet. able to hold partial squatting postion with CGA for ~ 5 seconds )  Ambulation  Ambulation?: Yes  Ambulation 1  Surface: level tile  Device: Rolling Walker  Assistance: Contact guard assistance  Quality of Gait: step through pattern, stride length within functional limits, moderately decreased sultana, min cueing required for navigation of RW.    Distance: 30 ft   Stairs/Curb  Stairs?: No     Balance  Posture: Good  Sitting - Static: Good  Sitting - Dynamic: Good(able to sit EOB for strength testing without UE support and SBA )  Standing - Static: Good(SBA at sink for self care activities for ~10 min )  Standing - Dynamic: Good;-(min increase in unsteadiness )        Plan   Plan  Times per week: 5-7  Times per day: Daily  Specific instructions for Next Treatment: further assessment of standing balance   Current Treatment Recommendations: Balance Training, Strengthening, Endurance Training, Gait Training, Functional Mobility Training, Safety Education & Training  Safety Devices  Type of devices: Patient at risk for falls, Call light within reach, Chair alarm in place, Nurse notified, Gait belt, All fall risk precautions in place, Left in chair(external female catheter able to be removed per RN Sarah Iqbal; patient left in dry briefs following toiletting. )  Restraints  Initially in place: No    AM-PAC Score  AM-PAC Inpatient Mobility Raw Score : 16  AM-PAC Inpatient T-Scale Score : 40.78  Mobility Inpatient CMS 0-100% Score: 54.16  Mobility Inpatient CMS G-Code Modifier : CK          Goals  Short term goals  Time Frame for Short term goals: By D/C  Short term goal 1: Patient to sit<>stand transfer with supervision and LRAD   Short term goal 2: Patient to ambulate 100 ft with supervision   Short term goal 3: Patient to perform bed mobility with supervision   Patient Goals   Patient goals : To go home       Therapy Time   Individual Concurrent Group Co-treatment   Time In 1047         Time Out 1143         Minutes 56         Timed minutes: 41  Total minutes: 101 ROXANA Mattson, SPT   Vitaliy Schofield, PT     I agree with the above note. PT directly observed the SPT with the patient.   Maryann Hull, [address] [facility] DPT [id number]

## 2019-05-10 ENCOUNTER — APPOINTMENT (OUTPATIENT)
Dept: MRI IMAGING | Age: 84
End: 2019-05-10
Payer: MEDICARE

## 2019-05-10 PROBLEM — R47.01 APHASIA: Status: ACTIVE | Noted: 2019-05-10

## 2019-05-10 PROCEDURE — 97116 GAIT TRAINING THERAPY: CPT

## 2019-05-10 PROCEDURE — 72148 MRI LUMBAR SPINE W/O DYE: CPT

## 2019-05-10 PROCEDURE — 97530 THERAPEUTIC ACTIVITIES: CPT

## 2019-05-10 PROCEDURE — 97110 THERAPEUTIC EXERCISES: CPT

## 2019-05-10 PROCEDURE — 2580000003 HC RX 258: Performed by: INTERNAL MEDICINE

## 2019-05-10 PROCEDURE — G0378 HOSPITAL OBSERVATION PER HR: HCPCS

## 2019-05-10 PROCEDURE — 6370000000 HC RX 637 (ALT 250 FOR IP): Performed by: INTERNAL MEDICINE

## 2019-05-10 PROCEDURE — 97535 SELF CARE MNGMENT TRAINING: CPT

## 2019-05-10 PROCEDURE — 99226 PR SBSQ OBSERVATION CARE/DAY 35 MINUTES: CPT | Performed by: PSYCHIATRY & NEUROLOGY

## 2019-05-10 RX ORDER — AMLODIPINE BESYLATE 5 MG/1
10 TABLET ORAL DAILY
Status: DISCONTINUED | OUTPATIENT
Start: 2019-05-10 | End: 2019-05-11 | Stop reason: HOSPADM

## 2019-05-10 RX ADMIN — AMLODIPINE BESYLATE 10 MG: 5 TABLET ORAL at 10:02

## 2019-05-10 RX ADMIN — Medication 10 ML: at 10:03

## 2019-05-10 RX ADMIN — POTASSIUM CHLORIDE 10 MEQ: 750 TABLET, FILM COATED, EXTENDED RELEASE ORAL at 10:02

## 2019-05-10 RX ADMIN — ASPIRIN 81 MG: 81 TABLET, COATED ORAL at 10:03

## 2019-05-10 ASSESSMENT — PAIN SCALES - GENERAL
PAINLEVEL_OUTOF10: 0

## 2019-05-10 NOTE — PROGRESS NOTES
Physical Therapy  Facility/Department: [address]  Daily Treatment Note  NAME: Taylor Ballard  : 1933  MRN: [id number]    Date of Service: 5/10/2019    Discharge Recommendations:Anya Gutierres scored a 17/24 on the AM-PAC short mobility form. Current research shows that an AM-PAC score of 17 or less is typically not associated with a discharge to the patient's home setting. Based on the patients AM-PAC score and their current functional mobility deficits, it is recommended that the patient have 5-7 sessions per week of Physical Therapy at d/c to increase the patients independence. Equipment Recommendations: No       Patient Diagnosis(es): The encounter diagnosis was Cerebrovascular accident (CVA), unspecified mechanism ([address] has a past medical history of Arthritis and Hypertension. has a past surgical history that includes Hysterectomy and Cholecystectomy. Restrictions  Restrictions/Precautions  Restrictions/Precautions: Fall Risk, Modified Diet(medium fall risk, cardiac diet )  Required Braces or Orthoses?: No  Position Activity Restriction  Other position/activity restrictions: Patient presented to the ED  with aphasia, R sided weakness, dizziness, R sided headache, and n/t in face. Subjective   General  Chart Reviewed: Yes  Family / Caregiver Present: No  Subjective  Subjective: Pt states overall feeling better, no pain at this time. General Comment  Comments: Pt supine in bed agreeable to PT, needs to use bathroom.   Pain Screening  Patient Currently in Pain: Denies  Pain Assessment  Pain Assessment: 0-10  Pain Level: 0  Vital Signs  Patient Currently in Pain: Denies       Orientation  Orientation  Overall Orientation Status: Within Functional Limits  Cognition      Objective   Bed mobility  Rolling to Right: Supervision  Supine to Sit: Stand by assistance  Scooting: Stand by assistance  Transfers  Sit to Stand: Stand by assistance  Stand to sit: Stand by assistance  Ambulation  Ambulation?: Yes  Ambulation 1  Surface: level tile  Device: Rolling Walker  Assistance: Contact guard assistance  Quality of Gait: Decreased sultana and step length. Improved with navigation of RW. Distance: 5' to bathroom; 100' in terry back to chair. Stairs/Curb  Stairs?: No     Balance  Posture: Good  Sitting - Static: Good  Sitting - Dynamic: Good  Standing - Static: Good  Standing - Dynamic: Good;-  Comments: Pt stood at sink ~3-5 min washing face and hands with supervision; no LOB. Exercises  Hip Flexion: seated x10 ea  Knee Long Arc Quad: seated x10ea  Ankle Pumps: seated x10 ea  Comments: Supervision with tolietry and standing to wash face and hands at sink. Assessment   Body structures, Functions, Activity limitations: Decreased functional mobility ; Decreased safe awareness;Decreased balance  Assessment: Very pleasant, slight fatigue at end of session. Prognosis: Good  REQUIRES PT FOLLOW UP: Yes  Activity Tolerance  Activity Tolerance: Patient Tolerated treatment well;Patient limited by fatigue  Activity Tolerance: Slight fatigue at end, no LOB durin session with activities. G-Code     OutComes Score                                                  -PAC Score  AM-PAC Inpatient Mobility Raw Score : 17  AM-PAC Inpatient T-Scale Score : 42.13  Mobility Inpatient CMS 0-100% Score: 50.57  Mobility Inpatient CMS G-Code Modifier : CK          Goals  Short term goals  Time Frame for Short term goals: By D/C  Short term goal 1: Patient to sit<>stand transfer with supervision and LRAD   Short term goal 2: Patient to ambulate 100 ft with supervision   Short term goal 3: Patient to perform bed mobility with supervision   Patient Goals   Patient goals :  To go home  NO GOALS MET THIS DATE    Plan    Plan  Times per week: 5-7  Times per day: Daily  Specific instructions for Next Treatment: further assessment of standing balance   Current Treatment Recommendations: Balance Training, Strengthening, Endurance Training, Gait Training, Functional Mobility Training, Safety Education & Training  Safety Devices  Type of devices: Patient at risk for falls, Call light within reach, Chair alarm in place, Nurse notified, Gait belt, All fall risk precautions in place, Left in chair  Restraints  Initially in place: No     Therapy Time   Individual Concurrent Group Co-treatment   Time In 0910         Time Out 0934         Minutes [address], [place] [address]      I agree with the note above. Goals addressed by PT this session.    [address], [place] [id number]

## 2019-05-10 NOTE — CARE COORDINATION
Discharge Planning Assessment  RN/SW discharge planner met with patient/(and family member) to discuss reason for admission, current living situation, and potential needs at the time of discharge    Demographics/Insurance verified Yes    Current type of dwelling: Alone(family sometimes spends the night when patient feels dizzy. no availability for 24 hour supervision. Pt has four daughters. )  Type of Home: Apartment  Home Layout: One level  Home Access: Level entry  Bathroom Shower/Tub: Walk-in shower  Bathroom Toilet: Handicap height  Bathroom Equipment: Shower chair, Hand-held shower  Bathroom Accessibility: Accessible  Home Equipment: 4 wheeled walker, [place] [employer] Help From: Family, Home health  ADL Assistance: Needs assistance(Daughter helps with bathing on Tuesdays and Thursdays, patient does independent sponge baths other days. )  Homemaking Assistance: Needs assistance  Meal Prep: Maximal(Meals on Wheels, occassional cooking)  PCP: Vicky Ash MD  Active with any community resources/agencies/skilled home care: [facility]  Transportation at the time of discharge:family    Tentative discharge plan: [facility]   Pending a home care order.

## 2019-05-10 NOTE — PLAN OF CARE
Problem: Falls - Risk of: Intervention: Assess risk factors for falls  Note:   Patient is a high fall risk. Patient free of falls this shift. Bed low, locked and alarmed at all times. Call light and bedside table is within reach. Yellow blanket on bed, arm band on wrist and fall sign posted in the room. Notified patient to ask for assistance when needed. Patient verbalized understanding. Problem: Cardiovascular  Goal: Hemodynamic stability  Note:   VSS upon assessments so far this shift. Problem: Nutrition  Intervention: Swallowing evaluation  Note:   Dietary order followed per Speech therapy evaluation note. Problem: ACTIVITY INTOLERANCE/IMPAIRED MOBILITY  Goal: Mobility/activity is maintained at optimum level for patient  Intervention: ASSESS FOR BARRIERS TO MOBILITY/ACTIVITY  Note:   Pt remains ambulatory w/ front wheel walker and standby/contact guard assistance. Pt tolerated ambulation to restroom fairly well. Problem: ACTIVITY INTOLERANCE/IMPAIRED MOBILITY  Goal: Mobility/activity is maintained at optimum level for patient  Intervention: TURN PATIENT  Note:   Pt remains able to reposition self in bed frequently as needed. No new skin issues noted so far this shift. Problem: COMMUNICATION IMPAIRMENT  Goal: Ability to express needs and understand communication  Intervention: ENCOURAGE PATIENT TO COMMUNICATE  Note:   Pt agrees to communicate needs w/ staff as needed. Pt remains Alert and oriented x4 upon assessments so far this shift.

## 2019-05-10 NOTE — PROGRESS NOTES
Occupational Therapy  Facility/Department: [address]  Daily Treatment Note  NAME: Michael Bach  : 1933  MRN: [id number]    Date of Service: 5/10/2019    Discharge Recommendations:Anya Blevins scored a 16/24 on the AM-PAC ADL Inpatient form. Current research shows that an AM-PAC score of 17 or less is typically not associated with a discharge to the patient's home setting. Based on the patients AM-PAC score and their current ADL deficits, it is recommended that the patient have 5-7 sessions per week of Occupational Therapy at d/c to increase the patients independence. OT Equipment Recommendations  Other: Shower chair    Assessment   Performance deficits / Impairments: Decreased functional mobility ; Decreased ADL status; Decreased strength;Decreased endurance;Decreased balance;Decreased high-level IADLs  Assessment: Patient presents with the above deficits, which are below baseline, and would benefit from continued skilled OT to address  Treatment Diagnosis: Decreased ADls, IADLs, transfers, mobility associated with Acute CVA  Prognosis: Good  REQUIRES OT FOLLOW UP: Yes  Activity Tolerance  Activity Tolerance: Patient Tolerated treatment well  Activity Tolerance: Experienced dizziness with prolonged stance  Safety Devices  Safety Devices in place: Yes  Type of devices: All fall risk precautions in place;Call light within reach;Nurse notified; Patient at risk for falls;Gait belt;Bed alarm in place         Patient Diagnosis(es): The encounter diagnosis was Cerebrovascular accident (CVA), unspecified mechanism ([address] has a past medical history of Arthritis and Hypertension. has a past surgical history that includes Hysterectomy and Cholecystectomy.     Restrictions  Restrictions/Precautions  Restrictions/Precautions: Fall Risk, Modified Diet(medium fall risk, cardiac diet )  Required Braces or Orthoses?: No  Position Activity Restriction  Other position/activity restrictions: Patient presented to the ED 5/8 with aphasia, R sided weakness, dizziness, R sided headache, and n/t in face. Subjective   General  Chart Reviewed: Yes  Patient assessed for rehabilitation services?: Yes  Family / Caregiver Present: No  Diagnosis: Acute CVA  Subjective  Subjective: Patient in chair, agreeable to therapy  Vital Signs  Patient Currently in Pain: Denies   Orientation  Orientation  Overall Orientation Status: Within Functional Limits  Objective    ADL  Grooming: Setup;Contact guard assistance(Partial seated, partial standing due to dizziness)  UE Bathing: Setup;Stand by assistance(basin at sink standing)  LE Bathing: Moderate assistance(seated due to dizziness)  UE Dressing: Stand by assistance  LE Dressing: Moderate assistance(assist threading L foot and assist with socks)  Toileting: None        Balance  Sitting Balance: Supervision  Standing Balance: Contact guard assistance(alternated seated and standing due to dizziness with prolonged stance)  Standing Balance  Time: ~8-10 minutes total  Activity: intermittant seated due to dizziness, bathing leni care  Functional Mobility  Functional - Mobility Device: Rolling Walker  Activity: To/from bathroom  Assist Level: Contact guard assistance  Functional Mobility Comments: slight LOB to R able to self correct  Bed mobility  Sit to Supine: Stand by assistance  Scooting: Stand by assistance  Transfers  Sit to stand: Contact guard assistance  Stand to sit: Contact guard assistance                       Cognition  Overall Cognitive Status: Exceptions  Arousal/Alertness: Delayed responses to stimuli  Following Commands: Follows one step commands with increased time; Follows one step commands with repetition  Memory: Decreased recall of recent events  Safety Judgement: Decreased awareness of need for safety  Initiation: Requires cues for some  Sequencing: Requires cues for some                                         Plan   Plan  Times per week: 5-7  Times per

## 2019-05-10 NOTE — PROGRESS NOTES
Jeannine Del Toro  Neurology Follow-up  [facility] Neurology    Date of Service: 5/10/2019    Subjective:   CC: Follow up today regarding: acute right leg weaknes    This is my first encounter with the patient. The patient was seen by Dr. Terri Wong. I was able to review the patient's record, imaging, notes from different physicians and recent events. The patient is a 80y.o.  years old female with history of chronic arthritis, back pain and hypertension who was admitted to [facility] for worsening the right leg weakness. The patient described chronic history of right leg weakness but worse in the last few days. No recent trauma or injury. She has chronic back pain but no radicular symptoms or signs. No dysphagia, dysarthria, headache, double vision or blurred vision. She came in to the hospital for evaluation. The patient today denies any new symptoms. He feels almost back to her baseline. Initial MRI of the brain showed no acute stroke. The patient had MRI of the lumbar spine today which showed multilevel chronic DJD and spinal stenosis. No other new symptoms today. She is currently on aspirin and statin. Other review of system was unremarkable.       ROS:   A 10-12 system obtained and updated today and is unremarkable except as mentioned in my HPI    Past Medical History:   Diagnosis Date    Arthritis     Hypertension      Current Facility-Administered Medications   Medication Dose Route Frequency Provider Last Rate Last Dose    amLODIPine (NORVASC) tablet 10 mg  10 mg Oral Daily Gaurang P Denis Claude, MD   10 mg at 05/10/19 1002    sodium chloride flush 0.9 % injection 10 mL  10 mL Intravenous 2 times per day Lynette Meadows MD   10 mL at 05/10/19 1003    sodium chloride flush 0.9 % injection 10 mL  10 mL Intravenous PRN Gaurang P Denis Claude, MD        magnesium hydroxide (MILK OF MAGNESIA) 400 MG/5ML suspension 30 mL  30 mL Oral Daily PRN Lynette Meadows MD        ondansetron (Martir Sample) sensation is intact  VII: Facial strength and movements: intact and symmetric  IX: Palate elevation is symmetric  XI: Shoulder shrug is intact  XII: Tongue movements are normal  Musculoskeletal: 5/5 in UE and 4/5 in LL. Tone: Normal tone. Reflexes: 2 in the arms and 1 in legs. Planters: flexor bilaterally. Coordination: no pronator drift, no dysmetria with FNF. Normal REM. Sensation: normal to all modalities in both arms and legs. Gait/Posture: steady gait      Data:  LABS:   Lab Results   Component Value Date     05/08/2019    K 3.7 05/08/2019     05/08/2019    CO2 27 05/08/2019    BUN 15 05/08/2019    CREATININE 0.8 05/08/2019    GFRAA >60 05/08/2019    LABGLOM >60 05/08/2019    GLUCOSE 122 05/08/2019    MG 1.90 03/19/2019    CALCIUM 9.8 05/08/2019     Lab Results   Component Value Date    WBC 6.2 05/08/2019    RBC 4.36 05/08/2019    HGB 13.4 05/08/2019    HCT 40.4 05/08/2019    MCV 92.7 05/08/2019    RDW 13.5 05/08/2019     05/08/2019     Lab Results   Component Value Date    INR 1.02 05/08/2019    PROTIME 11.6 05/08/2019     Neuroimaging and/or  labs reviewed by me and discussed results with the patient     Impression:  Acute on chronic right leg weakness. Possible related to lumbar radiculopathy and chronic DJD. Less likely vascular. Hypertension  Chronic arthritis  Depression      Recommendation  ASA  Statin  PT and OT  Monitor BP and continue with the same BP medications. Hold BP medications if BP below 120/80  Tele  Continue SSRI  Stroke prevention was discussed with the patient today  Pain control  Patient can be discharged from neurology when medically stable. Follow-up outpatient with orthopedics or neurosurgery regarding her chronic lumbar stenosis and DJD. No further recommendation           Mario Everett MD   [phone]      This dictation was generated by voice recognition computer software.  Although all attempts are made to edit the dictation for accuracy, there may be errors in the transcription that are not intended.

## 2019-05-10 NOTE — CARE COORDINATION
The Utilization Review Committee members, including its physician members, have reviewed this case and agree that this patient does not meet evidence based criteria for inpatient services, requiring a change in patient status from \"admit as inpatient\" to \"place in observation\" in accordance with condition code 44. Medical care will continue to be provided by Ayana Man MD, who concurs with this decision and has documented his/her concurrence in the patient's medical record.

## 2019-05-10 NOTE — PLAN OF CARE
Pt a high fall risk, fall prevention protocol in place. Pt remains injury free. Blood remains high. Pt started  On new BP med however pt refusing it, stated she has had this pill before and it made her have headaches. Will check home meds and let MD know.  Have not noticed any slurring of speech today

## 2019-05-10 NOTE — PROGRESS NOTES
[facility]    5/10/2019 2:45 PM        Name: Jeromy Mena . Admitted: 5/8/2019  Primary Care Provider: Sharon Goff (Tel: [phone])    Brief Course:  Able to walk till the Rest Room. Has weakness of the right  leg and minimal pain. Reviewed interval ancillary notes    Current Medications    amLODIPine (NORVASC) tablet 10 mg Daily   sodium chloride flush 0.9 % injection 10 mL 2 times per day   sodium chloride flush 0.9 % injection 10 mL PRN   magnesium hydroxide (MILK OF MAGNESIA) 400 MG/5ML suspension 30 mL Daily PRN   ondansetron (ZOFRAN) injection 4 mg Q6H PRN   enoxaparin (LOVENOX) injection 40 mg Nightly   aspirin EC tablet 81 mg Daily   atorvastatin (LIPITOR) tablet 40 mg Nightly   mirtazapine (REMERON) tablet 7.5 mg Nightly   potassium chloride (KLOR-CON) extended release tablet 10 mEq BID       Objective:  BP (!) 160/74   Pulse 78   Temp 98.3 °F (36.8 °C) (Oral)   Resp 16   Ht 5' 2\" (1.575 m)   Wt 179 lb 12.8 oz (81.6 kg)   SpO2 97%   BMI 32.89 kg/m²     Intake/Output Summary (Last 24 hours) at 5/10/2019 1445  Last data filed at 5/10/2019 0849  Gross per 24 hour   Intake 480 ml   Output --   Net 480 ml    Wt Readings from Last 3 Encounters:   05/10/19 179 lb 12.8 oz (81.6 kg)   03/22/19 176 lb 4.5 oz (80 kg)       General appearance:  Appears comfortable  Eyes: Sclera clear. Pupils equal.  ENT: Moist oral mucosa. Trachea midline, no adenopathy. Cardiovascular: Regular rhythm, normal S1, S2. No murmur. No edema in lower extremities  Respiratory: Not using accessory muscles. Good inspiratory effort. Clear to auscultation bilaterally, no wheeze or crackles. GI: Abdomen soft, no tenderness, not distended, normal bowel sounds  Musculoskeletal: No cyanosis in digits, neck supple  Neurology: grade 4 Power in RLE  Psych: Normal affect.  Alert and oriented in time, place and person  Skin: Warm, dry, normal turgor  Extremity exam shows brisk capillary refill. Peripheral pulses are palpable in lower extremities     Labs and Tests:  CBC:   Recent Labs     05/08/19  1251   WBC 6.2   HGB 13.4        BMP:  Recent Labs     05/08/19  1251      K 3.7      CO2 27   BUN 15   CREATININE 0.8   GLUCOSE 122*     Hepatic: Recent Labs     05/08/19  1251   AST 16   ALT 8*   BILITOT 0.4   ALKPHOS 118     MRI LUMBAR SPINE WO CONTRAST   Final Result   Lumbar scoliosis with degenerative anterolisthesis at L5-S1. Moderate-severe spinal canal stenosis L1-L2. Multilevel lateral recess   effacement. Neural foraminal narrowing, including severe right L5 neural foraminal   stenosis. MRI brain without contrast   Final Result   Mild chronic small ischemic disease. No acute stroke, midline shift or mass   effect. Prominence of the ventricular system which is discordant with degree of local   prominence. Although this may be a normal variant, but can sometimes can be   seen in setting of normal pressure hydrocephalus in the appropriate clinical   setting. Please correlate exam findings. Nuclear medicine cisternogram or   CSF flow study may be considered as clinically warranted. XR CHEST PORTABLE   Final Result   No acute process. CTA Head W Contrast   Final Result   No acute abnormality or flow-limiting stenosis in the major arteries of the   head and neck. CTA Neck W Contrast   Final Result   No acute abnormality or flow-limiting stenosis in the major arteries of the   head and neck. CT Head WO Contrast   Final Result   No acute intracranial abnormality. Stroke alert results were called by Dr. Twin Heredia. MD Zen to Zeb Billy on 5/8/2019 at 13:08. Problem List  Active Problems:    Acute CVA (cerebrovascular accident) [facility])    Aphasia  Resolved Problems:    * No resolved hospital problems.  *       Assessment & Plan:     Mri BRAIN does not show any acute Infarct. MRI spine shows   Lumbar scoliosis with degenerative anterolisthesis at L5-S1.       Moderate-severe spinal canal stenosis L1-L2.  Multilevel lateral recess   effacement.       Neural foraminal narrowing, including severe right L5 neural foraminal   stenosis. Neurosurgery Consulted. [facility] as patient has minimal pain in the back and has Minimal weakness in RLE and able to ambulate. Hypertension. Resume BP meds. Cardiac diet    PT    Diet: DIET CARDIAC;  Dysphagia III Advanced  Code:Full Code  DVT PPX lovenox  Disposition  Home as Per Neurosurgery Recommendation Most Likely in am.       Aracely Farrell MD   5/10/2019 2:45 PM

## 2019-05-11 VITALS
TEMPERATURE: 97.6 F | OXYGEN SATURATION: 97 % | SYSTOLIC BLOOD PRESSURE: 107 MMHG | BODY MASS INDEX: 32.2 KG/M2 | DIASTOLIC BLOOD PRESSURE: 53 MMHG | HEART RATE: 49 BPM | HEIGHT: 62 IN | RESPIRATION RATE: 18 BRPM | WEIGHT: 175 LBS

## 2019-05-11 PROCEDURE — 2580000003 HC RX 258: Performed by: INTERNAL MEDICINE

## 2019-05-11 PROCEDURE — G0378 HOSPITAL OBSERVATION PER HR: HCPCS

## 2019-05-11 PROCEDURE — 6370000000 HC RX 637 (ALT 250 FOR IP): Performed by: INTERNAL MEDICINE

## 2019-05-11 RX ORDER — ASPIRIN 81 MG/1
81 TABLET ORAL DAILY
Qty: 30 TABLET | Refills: 3 | Status: SHIPPED | OUTPATIENT
Start: 2019-05-12

## 2019-05-11 RX ADMIN — MIRTAZAPINE 7.5 MG: 15 TABLET, FILM COATED ORAL at 00:18

## 2019-05-11 RX ADMIN — Medication 10 ML: at 00:18

## 2019-05-11 RX ADMIN — POTASSIUM CHLORIDE 10 MEQ: 750 TABLET, FILM COATED, EXTENDED RELEASE ORAL at 00:18

## 2019-05-11 RX ADMIN — AMLODIPINE BESYLATE 10 MG: 5 TABLET ORAL at 09:55

## 2019-05-11 RX ADMIN — DESMOPRESSIN ACETATE 40 MG: 0.2 TABLET ORAL at 00:17

## 2019-05-11 RX ADMIN — ASPIRIN 81 MG: 81 TABLET, COATED ORAL at 09:55

## 2019-05-11 RX ADMIN — Medication 10 ML: at 09:55

## 2019-05-11 RX ADMIN — POTASSIUM CHLORIDE 10 MEQ: 750 TABLET, FILM COATED, EXTENDED RELEASE ORAL at 09:55

## 2019-05-11 RX ADMIN — METOPROLOL TARTRATE 25 MG: 25 TABLET, FILM COATED ORAL at 09:55

## 2019-05-11 ASSESSMENT — PAIN SCALES - GENERAL
PAINLEVEL_OUTOF10: 0

## 2019-05-11 NOTE — CARE COORDINATION
Faxed home care referral to [facility]. No HC order nor EDUARDO needs signed d/t being in Obs.  Electronically signed by PIOTR Laureano, ODUGLAS on 5/11/2019 at 2:00 PM

## 2019-05-11 NOTE — CONSULTS
Neurosurgery Consult Note    Reason for Consult: spinal stenosis  Requesting Physician: Dr. Chloe Lopez    Subjective:  [facility] / HPI:  Porsche Segura is a 80 y.o. female patient being seen for complaints of back, buttock and leg pain with weakness. This has been gradually worsening over time. Right leg pain/weakness greater than left. Symptoms worse with standing walking and improved with rest.    Past Medical History:   Diagnosis Date    Arthritis     Hypertension      Past Surgical History:   Procedure Laterality Date    CHOLECYSTECTOMY      HYSTERECTOMY       Lisinopril    Current Facility-Administered Medications:     amLODIPine (NORVASC) tablet 10 mg, 10 mg, Oral, Daily, Theron Calvillo MD, 10 mg at 05/10/19 1002    sodium chloride flush 0.9 % injection 10 mL, 10 mL, Intravenous, 2 times per day, Akanksha Calhoun MD, 10 mL at 05/11/19 0018    sodium chloride flush 0.9 % injection 10 mL, 10 mL, Intravenous, PRN, Akanksha Calhoun MD    magnesium hydroxide (MILK OF MAGNESIA) 400 MG/5ML suspension 30 mL, 30 mL, Oral, Daily PRN, Theron Calvillo MD    ondansetron (ZOFRAN) injection 4 mg, 4 mg, Intravenous, Q6H PRN, Theron Calvillo MD    enoxaparin (LOVENOX) injection 40 mg, 40 mg, Subcutaneous, Nightly, Theron Calvillo MD, 40 mg at 05/09/19 2310    aspirin EC tablet 81 mg, 81 mg, Oral, Daily, Theron Calvillo MD, 81 mg at 05/10/19 1003    atorvastatin (LIPITOR) tablet 40 mg, 40 mg, Oral, Nightly, Theron Calvillo MD, 40 mg at 05/11/19 0017    mirtazapine (REMERON) tablet 7.5 mg, 7.5 mg, Oral, Nightly, Theron Calvillo MD, 7.5 mg at 05/11/19 0018    potassium chloride (KLOR-CON) extended release tablet 10 mEq, 10 mEq, Oral, BID, Akanksha Calhoun MD, 10 mEq at 05/11/19 0018  Social History     Socioeconomic History    Marital status:       Spouse name: Not on file    Number of children: Not on file    Years of education: Not on file    Highest education level: Not on file Occupational History    Not on file   Social Needs    Financial resource strain: Not on file    Food insecurity:     Worry: Not on file     Inability: Not on file    Transportation needs:     Medical: Not on file     Non-medical: Not on file   Tobacco Use    Smoking status: Never Smoker    Smokeless tobacco: Never Used   Substance and Sexual Activity    Alcohol use: No    Drug use: No    Sexual activity: Not on file   Lifestyle    Physical activity:     Days per week: Not on file     Minutes per session: Not on file    Stress: Not on file   Relationships    Social connections:     Talks on phone: Not on file     Gets together: Not on file     Attends Restorationist service: Not on file     Active member of club or organization: Not on file     Attends meetings of clubs or organizations: Not on file     Relationship status: Not on file    Intimate partner violence:     Fear of current or ex partner: Not on file     Emotionally abused: Not on file     Physically abused: Not on file     Forced sexual activity: Not on file   Other Topics Concern    Not on file   Social History Narrative    Not on file     Family History   Problem Relation Age of Onset    High Blood Pressure Mother     Heart Disease Mother     High Blood Pressure Father     Cancer Sister     High Blood Pressure Sister     Diabetes Sister     Cancer Brother     Cancer Brother     Cancer Brother     Stroke Brother     Stroke Brother        ROS: Complete 10 point ROS was obtained with positives in HPI, otherwise:  Pt denies fevers, denies chills. Pt denies chest pain, denies SOB, denies nausea, denies vomiting, denies headache. PHYSICAL EXAMINATION:  BP (!) 176/84   Pulse 70   Temp 97.6 °F (36.4 °C) (Oral)   Resp 15   Ht 5' 2\" (1.575 m)   Wt 175 lb (79.4 kg)   SpO2 93%   BMI 32.01 kg/m²   GENERAL: elderly female in NAD  HEENT:  sclera clear and neck supple  NEUROLOGIC:  Awake, alert, oriented to name, place and time. Cranial nerves II-XII are grossly intact. Motor is 4+ out of 5 bilaterally. Gait is normal. Reflexes diminished throughout    LABORATORY DATA:   CBC with Differential:    Lab Results   Component Value Date    WBC 6.2 05/08/2019    RBC 4.36 05/08/2019    HGB 13.4 05/08/2019    HCT 40.4 05/08/2019     05/08/2019    MCV 92.7 05/08/2019    MCH 30.7 05/08/2019    MCHC 33.1 05/08/2019    RDW 13.5 05/08/2019    LYMPHOPCT 33.1 05/08/2019    MONOPCT 9.4 05/08/2019    BASOPCT 0.9 05/08/2019    MONOSABS 0.6 05/08/2019    LYMPHSABS 2.0 05/08/2019    EOSABS 0.4 05/08/2019    BASOSABS 0.1 05/08/2019     CMP:    Lab Results   Component Value Date     05/08/2019    K 3.7 05/08/2019     05/08/2019    CO2 27 05/08/2019    BUN 15 05/08/2019    CREATININE 0.8 05/08/2019    GFRAA >60 05/08/2019    AGRATIO 1.0 05/08/2019    LABGLOM >60 05/08/2019    GLUCOSE 122 05/08/2019    PROT 8.5 05/08/2019    LABALBU 4.3 05/08/2019    CALCIUM 9.8 05/08/2019    BILITOT 0.4 05/08/2019    ALKPHOS 118 05/08/2019    AST 16 05/08/2019    ALT 8 05/08/2019        IMAGING STUDIES:   MRI of the Lumbar-Sacral Spine:  Date: 5/10/19; Result:      FINDINGS:   BONES/ALIGNMENT: Approximately 6 mm anterolisthesis at L5-S1.  No evidence of   a pars defect.  Lumbar scoliosis with apex right curvature.  Severe   multilevel disc desiccation and degenerative endplate signal change.  There   is no acute fracture.       SPINAL CORD: The conus terminates normally.       SOFT TISSUES: No paraspinal mass identified.       T11-T12: Diffuse disc bulging.  Bilateral facet hypertrophy and ligamentum   flavum infolding.  Spinal canal stenosis and borderline cord compression.    Cord flattening is present.  AP dimension of the thecal sac measures 7 mm at   midline.       T12-L1: Bilateral facet hypertrophy and ligamentum flavum infolding.  Diffuse   disc bulging.  AP dimension of the thecal sac measures 8 mm at midline.       L1-L2: Bilateral facet hypertrophy injections or as last resort surgical decompression. Given her advanced age would recommend starting with conservative treatment. Pt in agreement and not interested in surgery and hesitant about OLGA. [id number] [address]ham for d/c home with outpt PT and f/u to my office to help facilitate OLGA should she wish to prusue this option. The patient's symptoms, exam, testing and plan of care have been discussed with Dr. Keely Teague. Will sign off. Thank you again for this consultation.     Yojana Kumar  5/11/2019

## 2019-05-11 NOTE — DISCHARGE SUMMARY
in the major arteries of the   head and neck. CTA Neck W Contrast   Final Result   No acute abnormality or flow-limiting stenosis in the major arteries of the   head and neck. CT Head WO Contrast   Final Result   No acute intracranial abnormality. Stroke alert results were called by Dr. Heaven Alfonso. MD Zen to Taylor Jonor on 5/8/2019 at 13:08. Invasive procedures and treatments. 1. None     Problem-based Hospital Course. Francois Farmer a 80 y. o. female who presents to the emergency department with right-sided weakness.  This is a 71-year-old female who presents with some right-sided weakness that started around 11:30 AM.  She states she got up to walk and it felt like her right toes were crawling on her. Lala Allen also complained of a headache and states the right side of her face feels funny.  According to the EMS she may also have had difficulty speaking initially.      Stroke Neurology was Consulted and tpa was offered however patient Refused    Mri BRAIN does not show any acute Infarct.      MRI spine shows   Lumbar scoliosis with degenerative anterolisthesis at L5-S1.       Moderate-severe spinal canal stenosis L1-L2.  Multilevel lateral recess   effacement.       Neural foraminal narrowing, including severe right L5 neural foraminal   stenosis.      Neurosurgery Consulted. [facility] as patient has minimal pain in the back and has Minimal weakness in RLE and able to ambulate. As Per Neuro surgery Out patient Follow [facility]      Hypertension. Resume BP meds.      Cardiac diet     PT     Able to walk to the Rest room and minimal back pain         Consults. IP CONSULT TO HOSPITALIST  IP CONSULT TO NEUROLOGY  IP CONSULT TO NEUROSURGERY    Physical examination on discharge day. BP (!) 153/78   Pulse 62   Temp 97.6 °F (36.4 °C) (Oral)   Resp 18   Ht 5' 2\" (1.575 m)   Wt 175 lb (79.4 kg)   SpO2 97%   BMI 32.01 kg/m²   General appearance. Alert.  Looks comfortable. HEENT. Sclera clear. Moist mucus membranes. Cardiovascular. Regular rate and rhythm, normal S1, S2. No murmur. Respiratory. Not using accessory muscles. Clear to auscultation bilaterally, no wheeze. Gastrointestinal. Abdomen soft, non-tender, not distended, normal bowel sounds  Neurology. Facial symmetry. No speech deficits. Moving all extremities equally. Extremities. No edema in lower extremities. Skin. Warm, dry, normal turgor        Condition at time of discharge stable    Medication instructions provided to patient at discharge. Medication List      START taking these medications    aspirin 81 MG EC tablet  Take 1 tablet by mouth daily  Start taking on:  5/12/2019        CONTINUE taking these medications    amLODIPine 10 MG tablet  Commonly known as:  NORVASC     hydrALAZINE 25 MG tablet  Commonly known as:  APRESOLINE  Take 1 tablet by mouth every 8 hours     hydrochlorothiazide 25 MG tablet  Commonly known as:  HYDRODIURIL  Take 1 tablet by mouth daily. mirtazapine 7.5 MG tablet  Commonly known as:  REMERON     potassium chloride 10 MEQ extended release capsule  Commonly known as:  MICRO-K     pravastatin 20 MG tablet  Commonly known as:  PRAVACHOL        STOP taking these medications    nitroGLYCERIN 0.4 MG SL tablet  Commonly known as:  NITROSTAT           Where to Get Your Medications      These medications were sent to [address] [place] [address], . [address]    Hours:  24-hours Phone:  [phone]   · aspirin 81 MG EC tablet         Discharge recommendations given to patient. Follow Up. PCP, [facility]  in 1 week   Disposition. home  Activity. activity as tolerated  Diet: DIET CARDIAC; Dysphagia III Advanced      Spent 22 minutes in discharge process.     Zane Gonzalez MD     5/11/2019 10:19 AM

## 2019-05-13 NOTE — PROGRESS NOTES
Patient : Rubén Carson Age: 61 year old Sex: male   MRN: [id number] Encounter Date: 10/9/2017      History     Chief Complaint   Patient presents with   • Hypotension     The history is provided by the patient, the EMS personnel and medical records.   History rather limited as the patient is very sleepy on arrival. He states he didn't sleep well, that when he is tired now and he had some sort of syncopal event in which he pitched forward onto his face. Denies recent alcohol ingestion, denies drug use. MS found to be hypotensive with a reported prehospital blood pressure of 60/40, he was given a fluid bolus of 400 cc and arrives here with a normal blood pressure. There is no reported respiratory compromise, he was not given Narcan. Patient denies headache, denies chest pain. Denies any recent illness. No reported seizure activity or vomiting. Does have a listed history of seizures.    No Known Allergies    Prior to Admission Medications    CARBAMAZEPINE (TEGRETOL) 200 MG TABLET    Take 200 mg by mouth nightly.    CHOLECALCIFEROL (VITAMIN D3) 36194 UNITS CAPS    Take  by mouth.    CLONAZEPAM (KLONOPIN) 1 MG TABLET    Take 1 mg by mouth 2 times daily as needed.    DIPHENHYDRAMINE (BENADRYL) 25 MG CAPSULE    Take 1 capsule by mouth 3 times daily as needed for Allergies (Tongue symtpoms.).    DIVALPROEX (DEPAKOTE) 125 MG EC TABLET    Take 1 tablet by mouth 2 times daily.    DIVALPROEX (DEPAKOTE) 125 MG SPRINKLE    Take 125 mg by mouth 2 times daily.    DIVALPROEX (DEPAKOTE) 500 MG EC TABLET    Take 3,500 mg by mouth daily.    FLUTICASONE-SALMETEROL (ADVAIR DISKUS) 250-50 MCG/DOSE AEROSOL POWDER, BREATH ACTIVATED    Inhale 1 puff into the lungs two times daily.    IBUPROFEN (MOTRIN) 400 MG TABLET    Take 1 tablet by mouth every 8 hours as needed for Pain.    LISINOPRIL (ZESTRIL) 20 MG TABLET    Take 20 mg by mouth daily.    LORAZEPAM PO    Take  by mouth.    MECLIZINE (ANTIVERT) 12.5 MG TABLET    Take 1 tablet by mouth 3  Speech/Language Pathology  Discharge Note    Name: Gail Farrell   : 1933     Speech Therapy/Dysphagia  Pt discharged to home on 19. Bedside Swallow Eval completed 19 (see report). No goals met prior to discharge. Recommend: Continued Dysphagia treatment at home, with goals and treatment per [facility]. DIET LEVEL AT DISCHARGE:  Dysphagia III (advanced) diet with thin liquids, meds as tolerated    DYSPHAGIA GOALS:  1.) Pt will tolerate recommended diet without s/s of aspiration (GOAL NOT MET)  2.) If clinical symptoms of penetration/aspiration continue to be noted,Pt will tolerate MBS to r/o aspiration and determine appropriate diet/liquid level.   (GOAL NOT MET)  3.) Pt will tolerate diet advance to least restricted diet, as clinically indicated, with no signs of aspiration  (GOAL NOT MET)    Joshua Pitt M.A., [address] [facility], [place]  Speech-Language Pathologist times daily as needed for Dizziness.    METFORMIN (GLUCOPHAGE) 500 MG TABLET    Take 500 mg by mouth 2 times daily (with meals).    PANTOPRAZOLE (PROTONIX) 40 MG TABLET    Take 40 mg by mouth 2 times daily.    SERTRALINE (ZOLOFT) 100 MG TABLET    Take 100 mg by mouth daily.    SIMVASTATIN (ZOCOR) 40 MG TABLET    Take 40 mg by mouth nightly.    TIOTROPIUM (SPIRIVA HANDIHALER) 18 MCG INHALATION CAPSULE    Place 1 capsule into inhaler and inhale daily.    ZIPRASIDONE (GEODON) 80 MG CAPSULE    Take 80 mg by mouth 2 times daily (with meals).       New Prescriptions    No medications on file       Past Medical History:   Diagnosis Date   • Diabetes mellitus (CMS/HCC)    • Essential (primary) hypertension    • Osteoarthritis of knee    • Seizures (CMS/HCC)        Past Surgical History:   Procedure Laterality Date   • ABDOMEN SURGERY     • ABDOMEN SURGERY     • APPENDECTOMY  1970   • COLONOSCOPY  1/27/2011    normal/10yr recall, screening Affi       No family history on file.    Social History   Substance Use Topics   • Smoking status: Current Every Day Smoker     Packs/day: 1.00     Types: Cigarettes   • Smokeless tobacco: Never Used   • Alcohol use Yes      Comment: occ.       Review of Systems   Unable to perform ROS: Mental status change       Physical Exam     ED Triage Vitals [10/09/17 1605]   ED Triage Vitals Group      Temp 97.3 °F (36.3 °C)      Pulse 66      Resp 14      /59      SpO2 98 %      EtCO2 mmHg       Height       Weight       Weight Scale Used        Physical Exam   Constitutional: He is oriented to person, place, and time. He appears well-developed and well-nourished. He appears distressed.   HENT:   Head: Normocephalic and atraumatic.   Patient has a laceration in his upper lip, midline, as well as an abrasion to the lower lip. Poor dentition, no obvious acute dental injury   Eyes: Pupils are equal, round, and reactive to light. Right eye exhibits no discharge. Left eye exhibits no discharge.  Right conjunctiva is not injected. Left conjunctiva is not injected.   Pupils 2 mm and minimally reactive bilaterally.   Neck: Neck supple. No tracheal deviation present.   Cardiovascular: Normal rate, regular rhythm and normal heart sounds.  Exam reveals no gallop.    No murmur heard.  Pulmonary/Chest: Effort normal. No accessory muscle usage or stridor. No respiratory distress. He has no wheezes. He has no rales.   Abdominal: Soft. Bowel sounds are normal. There is no hepatosplenomegaly. There is no tenderness. There is no rebound and no guarding.   Musculoskeletal: Normal range of motion. He exhibits no edema or tenderness.   Neurological: He is alert and oriented to person, place, and time. GCS eye subscore is 4. GCS verbal subscore is 5. GCS motor subscore is 6.   Sleepy, slow to respond   Skin: Skin is warm and dry. No rash noted.   Psychiatric: He has a normal mood and affect. His behavior is normal.   Nursing note and vitals reviewed.      ED Course     Procedures    Lab Results     No results found for this visit on 10/09/17.    EKG Results     EKG Interpretation  Rate: 90  Rhythm: normal sinus rhythm   Abnormality: yes nonspecific ST changes    EKG interpreted by ED physician    Radiology Results     Imaging Results    None         ED Medication Orders     Start Ordered     Status Ordering Provider    10/09/17 1613 10/09/17 1612  naLOXone (NARCAN) injection 0.4 mg  ONCE      Ordered CT BRANDON   Saline bolus here, patient given Narcan.  No change with Narcan, patient possibly post ictal given history.  Alcohol level 0.  CT scan had some motion artifact, questionable nasal bone fracture, no intracranial abnormalities.  6:21 PM  Patient verbally consented for repair of lip laceration, patient agreeable to this plan, understands risks and benefits of this procedure. Patient identified by name and arm band.  Patient given 2 cc lidocaine without epinephrine for anesthesia, patient tolerated  this well.  Patient prepped and draped in standard fashion,  Patient had 4 sutures placed 2 of them were internal 2 of them were slightly visible, all of which were in the mucosal portion of the lower lip. These were 6-0 chromic, absorbable.  Patient tolerated this well, this was performed by myself and PA assisting.   Advised patient to continue his usual medications including his seizure medications. Options discussed including observation in the hospital, patient declines, and respiratory with steady gait and normal mental status.    Clinical Impression     No diagnosis found.   Syncope, lip laceration    Disposition        There is no disposition   There is no comment                  Nery Hampton MD  10/09/17 9945       Nery Hampton MD  10/24/17 9642

## 2019-05-13 NOTE — PROGRESS NOTES
Physical Therapy Discharge Summary    Name: Jana Garcia  : 1933    The pt was evaluated by PT on 19 and seen for 1 treatment sessions prior to DC to home on 19 per MD order. The pt's acute therapy goals were:  Short term goals  Time Frame for Short term goals: By D/C  Short term goal 1: Patient to sit<>stand transfer with supervision and LRAD   Short term goal 2: Patient to ambulate 100 ft with supervision   Short term goal 3: Patient to perform bed mobility with supervision        Patient met 0 goals during stay. Number of Refusals:0  Number of Holds: 0  During this hospitalization, the patient was educated on:  Patient Education: PT/OT robe, POC     DC pt from PT caseload at this time. Thank you!     [address] [place], DPT [id number]

## 2019-05-13 NOTE — PROGRESS NOTES
Occupational Therapy  Jana Garcia  Occupational Therapy Discharge Summary    Name: Jana Garcia  : 1933    The pt was evaluated by OT on 19 and seen for 1 treatment sessions prior to DC to home on 19 per MD order. The pt's acute therapy goals were:  Short term goals  Time Frame for Short term goals: Discharge   Short term goal 1: Patient will bathe supervision-UE SBA, LE MOD A  Short term goal 2: Patient will dress UB Roxy, LB modA-UB SBA, LB MOD A GOAL MET 5/10  Short term goal 3: Patient will perform functional ADL transfers CGA to sBA-CGA with RW  Short term goal 4: Patient will perform functional mobility SBA with appropriate AD-CGA due to dizziness, slight LOB to R  Long term goals  Time Frame for Long term goals : LTG=STG     Patient met 1 goals during stay. Number of Refusals:0  Number of Holds: 0  During this hospitalization, the patient was educated on:  Patient Education: Eval, POC, discharge recommendations    DC pt from OT caseload at this time. Thank you!   Roby Leon OTR/L [phone]

## 2020-04-23 ENCOUNTER — HOSPITAL ENCOUNTER (OUTPATIENT)
Age: 85
Setting detail: OBSERVATION
Discharge: HOME | End: 2020-04-28
Attending: EMERGENCY MEDICINE | Admitting: INTERNAL MEDICINE
Payer: MEDICARE

## 2020-04-23 ENCOUNTER — APPOINTMENT (OUTPATIENT)
Dept: GENERAL RADIOLOGY | Age: 85
End: 2020-04-23
Payer: MEDICARE

## 2020-04-23 ENCOUNTER — APPOINTMENT (OUTPATIENT)
Dept: CT IMAGING | Age: 85
End: 2020-04-23
Payer: MEDICARE

## 2020-04-23 PROBLEM — R29.90 STROKE-LIKE SYMPTOMS: Status: ACTIVE | Noted: 2020-04-23

## 2020-04-23 LAB
A/G RATIO: 1 (ref 1.1–2.2)
ALBUMIN SERPL-MCNC: 4.1 G/DL (ref 3.4–5)
ALP BLD-CCNC: 117 U/L (ref 40–129)
ALT SERPL-CCNC: 8 U/L (ref 10–40)
ANION GAP SERPL CALCULATED.3IONS-SCNC: 11 MMOL/L (ref 3–16)
APTT: 31.7 SEC (ref 24.2–36.2)
AST SERPL-CCNC: 20 U/L (ref 15–37)
BASOPHILS ABSOLUTE: 0 K/UL (ref 0–0.2)
BASOPHILS RELATIVE PERCENT: 0.7 %
BILIRUB SERPL-MCNC: 0.4 MG/DL (ref 0–1)
BUN BLDV-MCNC: 20 MG/DL (ref 7–20)
CALCIUM SERPL-MCNC: 9.7 MG/DL (ref 8.3–10.6)
CHLORIDE BLD-SCNC: 99 MMOL/L (ref 99–110)
CO2: 29 MMOL/L (ref 21–32)
CREAT SERPL-MCNC: 0.9 MG/DL (ref 0.6–1.2)
EOSINOPHILS ABSOLUTE: 0.3 K/UL (ref 0–0.6)
EOSINOPHILS RELATIVE PERCENT: 5.6 %
GFR AFRICAN AMERICAN: >60
GFR NON-AFRICAN AMERICAN: 59
GLOBULIN: 4 G/DL
GLUCOSE BLD-MCNC: 138 MG/DL (ref 70–99)
HCT VFR BLD CALC: 41.7 % (ref 36–48)
HEMOGLOBIN: 13.9 G/DL (ref 12–16)
INR BLD: 1.03 (ref 0.86–1.14)
LYMPHOCYTES ABSOLUTE: 1.2 K/UL (ref 1–5.1)
LYMPHOCYTES RELATIVE PERCENT: 22.3 %
MCH RBC QN AUTO: 30.9 PG (ref 26–34)
MCHC RBC AUTO-ENTMCNC: 33.3 G/DL (ref 31–36)
MCV RBC AUTO: 92.5 FL (ref 80–100)
MONOCYTES ABSOLUTE: 0.4 K/UL (ref 0–1.3)
MONOCYTES RELATIVE PERCENT: 6.8 %
NEUTROPHILS ABSOLUTE: 3.5 K/UL (ref 1.7–7.7)
NEUTROPHILS RELATIVE PERCENT: 64.6 %
PDW BLD-RTO: 13.8 % (ref 12.4–15.4)
PLATELET # BLD: 201 K/UL (ref 135–450)
PMV BLD AUTO: 8.5 FL (ref 5–10.5)
POTASSIUM SERPL-SCNC: 3.6 MMOL/L (ref 3.5–5.1)
PRO-BNP: 172 PG/ML (ref 0–449)
PROTHROMBIN TIME: 11.9 SEC (ref 10–13.2)
RBC # BLD: 4.51 M/UL (ref 4–5.2)
SODIUM BLD-SCNC: 139 MMOL/L (ref 136–145)
TOTAL PROTEIN: 8.1 G/DL (ref 6.4–8.2)
TROPONIN: <0.01 NG/ML
WBC # BLD: 5.4 K/UL (ref 4–11)

## 2020-04-23 PROCEDURE — 85730 THROMBOPLASTIN TIME PARTIAL: CPT

## 2020-04-23 PROCEDURE — 83880 ASSAY OF NATRIURETIC PEPTIDE: CPT

## 2020-04-23 PROCEDURE — 70450 CT HEAD/BRAIN W/O DYE: CPT

## 2020-04-23 PROCEDURE — 80053 COMPREHEN METABOLIC PANEL: CPT

## 2020-04-23 PROCEDURE — 84443 ASSAY THYROID STIM HORMONE: CPT

## 2020-04-23 PROCEDURE — 85025 COMPLETE CBC W/AUTO DIFF WBC: CPT

## 2020-04-23 PROCEDURE — 93005 ELECTROCARDIOGRAM TRACING: CPT | Performed by: PHYSICIAN ASSISTANT

## 2020-04-23 PROCEDURE — 36415 COLL VENOUS BLD VENIPUNCTURE: CPT

## 2020-04-23 PROCEDURE — 71045 X-RAY EXAM CHEST 1 VIEW: CPT

## 2020-04-23 PROCEDURE — 6370000000 HC RX 637 (ALT 250 FOR IP): Performed by: PHYSICIAN ASSISTANT

## 2020-04-23 PROCEDURE — G0378 HOSPITAL OBSERVATION PER HR: HCPCS

## 2020-04-23 PROCEDURE — 84484 ASSAY OF TROPONIN QUANT: CPT

## 2020-04-23 PROCEDURE — 82607 VITAMIN B-12: CPT

## 2020-04-23 PROCEDURE — 99285 EMERGENCY DEPT VISIT HI MDM: CPT

## 2020-04-23 PROCEDURE — 85610 PROTHROMBIN TIME: CPT

## 2020-04-23 RX ORDER — FAMOTIDINE 20 MG/1
20 TABLET, FILM COATED ORAL DAILY
Status: DISCONTINUED | OUTPATIENT
Start: 2020-04-24 | End: 2020-04-28 | Stop reason: HOSPADM

## 2020-04-23 RX ORDER — ASPIRIN 325 MG
325 TABLET ORAL ONCE
Status: DISCONTINUED | OUTPATIENT
Start: 2020-04-23 | End: 2020-04-24 | Stop reason: HOSPADM

## 2020-04-23 ASSESSMENT — ENCOUNTER SYMPTOMS
ABDOMINAL PAIN: 0
RHINORRHEA: 0
COUGH: 0
DIARRHEA: 0
NAUSEA: 0
VOMITING: 0
SHORTNESS OF BREATH: 0

## 2020-04-23 ASSESSMENT — PAIN SCALES - GENERAL: PAINLEVEL_OUTOF10: 0

## 2020-04-23 NOTE — ED NOTES
Bed: 05  Expected date:   Expected time:   Means of arrival: [address] [facility]  Comments:     Lorna Victoria RN  04/23/20 1942

## 2020-04-24 ENCOUNTER — APPOINTMENT (OUTPATIENT)
Dept: CT IMAGING | Age: 85
End: 2020-04-24
Payer: MEDICARE

## 2020-04-24 ENCOUNTER — APPOINTMENT (OUTPATIENT)
Dept: MRI IMAGING | Age: 85
End: 2020-04-24
Payer: MEDICARE

## 2020-04-24 LAB
ANION GAP SERPL CALCULATED.3IONS-SCNC: 12 MMOL/L (ref 3–16)
BILIRUBIN URINE: NEGATIVE
BLOOD, URINE: NEGATIVE
BUN BLDV-MCNC: 16 MG/DL (ref 7–20)
CALCIUM SERPL-MCNC: 9.7 MG/DL (ref 8.3–10.6)
CHLORIDE BLD-SCNC: 101 MMOL/L (ref 99–110)
CHOLESTEROL, TOTAL: 134 MG/DL (ref 0–199)
CLARITY: ABNORMAL
CO2: 29 MMOL/L (ref 21–32)
COLOR: YELLOW
CREAT SERPL-MCNC: 0.8 MG/DL (ref 0.6–1.2)
EKG ATRIAL RATE: 82 BPM
EKG DIAGNOSIS: NORMAL
EKG P AXIS: 39 DEGREES
EKG P-R INTERVAL: 180 MS
EKG Q-T INTERVAL: 400 MS
EKG QRS DURATION: 120 MS
EKG QTC CALCULATION (BAZETT): 467 MS
EKG R AXIS: -35 DEGREES
EKG T AXIS: 88 DEGREES
EKG VENTRICULAR RATE: 82 BPM
EPITHELIAL CELLS, UA: 1 /HPF (ref 0–5)
GFR AFRICAN AMERICAN: >60
GFR NON-AFRICAN AMERICAN: >60
GLUCOSE BLD-MCNC: 82 MG/DL (ref 70–99)
GLUCOSE BLD-MCNC: 87 MG/DL (ref 70–99)
GLUCOSE URINE: NEGATIVE MG/DL
HCT VFR BLD CALC: 39.7 % (ref 36–48)
HDLC SERPL-MCNC: 41 MG/DL (ref 40–60)
HEMOGLOBIN: 13.3 G/DL (ref 12–16)
HYALINE CASTS: 1 /LPF (ref 0–8)
KETONES, URINE: NEGATIVE MG/DL
LDL CHOLESTEROL CALCULATED: 79 MG/DL
LEUKOCYTE ESTERASE, URINE: ABNORMAL
MAGNESIUM: 2 MG/DL (ref 1.8–2.4)
MCH RBC QN AUTO: 30.9 PG (ref 26–34)
MCHC RBC AUTO-ENTMCNC: 33.4 G/DL (ref 31–36)
MCV RBC AUTO: 92.6 FL (ref 80–100)
MICROSCOPIC EXAMINATION: YES
NITRITE, URINE: NEGATIVE
PDW BLD-RTO: 13.6 % (ref 12.4–15.4)
PERFORMED ON: NORMAL
PH UA: 6 (ref 5–8)
PLATELET # BLD: 202 K/UL (ref 135–450)
PMV BLD AUTO: 8.5 FL (ref 5–10.5)
POTASSIUM REFLEX MAGNESIUM: 3.1 MMOL/L (ref 3.5–5.1)
PROTEIN UA: NEGATIVE MG/DL
RBC # BLD: 4.29 M/UL (ref 4–5.2)
RBC UA: 1 /HPF (ref 0–4)
SODIUM BLD-SCNC: 142 MMOL/L (ref 136–145)
SPECIFIC GRAVITY UA: >1.03 (ref 1–1.03)
TRIGL SERPL-MCNC: 71 MG/DL (ref 0–150)
TSH SERPL DL<=0.05 MIU/L-ACNC: 2.45 UIU/ML (ref 0.27–4.2)
URINE REFLEX TO CULTURE: YES
URINE TYPE: ABNORMAL
UROBILINOGEN, URINE: 1 E.U./DL
VITAMIN B-12: 339 PG/ML (ref 211–911)
VLDLC SERPL CALC-MCNC: 14 MG/DL
WBC # BLD: 5.8 K/UL (ref 4–11)
WBC UA: 5 /HPF (ref 0–5)

## 2020-04-24 PROCEDURE — G0378 HOSPITAL OBSERVATION PER HR: HCPCS

## 2020-04-24 PROCEDURE — 99215 OFFICE O/P EST HI 40 MIN: CPT | Performed by: PSYCHIATRY & NEUROLOGY

## 2020-04-24 PROCEDURE — 2580000003 HC RX 258: Performed by: PHYSICIAN ASSISTANT

## 2020-04-24 PROCEDURE — 70498 CT ANGIOGRAPHY NECK: CPT

## 2020-04-24 PROCEDURE — 94760 N-INVAS EAR/PLS OXIMETRY 1: CPT

## 2020-04-24 PROCEDURE — 93010 ELECTROCARDIOGRAM REPORT: CPT | Performed by: INTERNAL MEDICINE

## 2020-04-24 PROCEDURE — 85027 COMPLETE CBC AUTOMATED: CPT

## 2020-04-24 PROCEDURE — 81001 URINALYSIS AUTO W/SCOPE: CPT

## 2020-04-24 PROCEDURE — 97530 THERAPEUTIC ACTIVITIES: CPT

## 2020-04-24 PROCEDURE — 6370000000 HC RX 637 (ALT 250 FOR IP): Performed by: PHYSICIAN ASSISTANT

## 2020-04-24 PROCEDURE — 6360000002 HC RX W HCPCS: Performed by: PHYSICIAN ASSISTANT

## 2020-04-24 PROCEDURE — 6370000000 HC RX 637 (ALT 250 FOR IP): Performed by: INTERNAL MEDICINE

## 2020-04-24 PROCEDURE — 97116 GAIT TRAINING THERAPY: CPT

## 2020-04-24 PROCEDURE — 80061 LIPID PANEL: CPT

## 2020-04-24 PROCEDURE — 96372 THER/PROPH/DIAG INJ SC/IM: CPT

## 2020-04-24 PROCEDURE — 83735 ASSAY OF MAGNESIUM: CPT

## 2020-04-24 PROCEDURE — 6360000004 HC RX CONTRAST MEDICATION: Performed by: PHYSICIAN ASSISTANT

## 2020-04-24 PROCEDURE — 80048 BASIC METABOLIC PNL TOTAL CA: CPT

## 2020-04-24 PROCEDURE — 87086 URINE CULTURE/COLONY COUNT: CPT

## 2020-04-24 PROCEDURE — 70551 MRI BRAIN STEM W/O DYE: CPT

## 2020-04-24 PROCEDURE — 97161 PT EVAL LOW COMPLEX 20 MIN: CPT

## 2020-04-24 RX ORDER — PRAVASTATIN SODIUM 20 MG
20 TABLET ORAL DAILY
Status: DISCONTINUED | OUTPATIENT
Start: 2020-04-24 | End: 2020-04-24

## 2020-04-24 RX ORDER — AMLODIPINE BESYLATE 5 MG/1
10 TABLET ORAL DAILY
Status: DISCONTINUED | OUTPATIENT
Start: 2020-04-24 | End: 2020-04-28 | Stop reason: HOSPADM

## 2020-04-24 RX ORDER — ASPIRIN 81 MG/1
81 TABLET ORAL DAILY
Status: DISCONTINUED | OUTPATIENT
Start: 2020-04-24 | End: 2020-04-28 | Stop reason: HOSPADM

## 2020-04-24 RX ORDER — HYDROCHLOROTHIAZIDE 25 MG/1
25 TABLET ORAL DAILY
Status: DISCONTINUED | OUTPATIENT
Start: 2020-04-24 | End: 2020-04-28 | Stop reason: HOSPADM

## 2020-04-24 RX ORDER — POTASSIUM CHLORIDE 20 MEQ/1
40 TABLET, EXTENDED RELEASE ORAL PRN
Status: DISCONTINUED | OUTPATIENT
Start: 2020-04-24 | End: 2020-04-28 | Stop reason: HOSPADM

## 2020-04-24 RX ORDER — POTASSIUM CHLORIDE 7.45 MG/ML
10 INJECTION INTRAVENOUS PRN
Status: DISCONTINUED | OUTPATIENT
Start: 2020-04-24 | End: 2020-04-28 | Stop reason: HOSPADM

## 2020-04-24 RX ORDER — ATORVASTATIN CALCIUM 40 MG/1
40 TABLET, FILM COATED ORAL DAILY
Status: DISCONTINUED | OUTPATIENT
Start: 2020-04-25 | End: 2020-04-28 | Stop reason: HOSPADM

## 2020-04-24 RX ORDER — SODIUM CHLORIDE 0.9 % (FLUSH) 0.9 %
10 SYRINGE (ML) INJECTION PRN
Status: DISCONTINUED | OUTPATIENT
Start: 2020-04-24 | End: 2020-04-28 | Stop reason: HOSPADM

## 2020-04-24 RX ORDER — LABETALOL HYDROCHLORIDE 5 MG/ML
10 INJECTION, SOLUTION INTRAVENOUS EVERY 10 MIN PRN
Status: DISCONTINUED | OUTPATIENT
Start: 2020-04-24 | End: 2020-04-28 | Stop reason: HOSPADM

## 2020-04-24 RX ORDER — ACETAMINOPHEN 325 MG/1
650 TABLET ORAL EVERY 4 HOURS PRN
Status: DISCONTINUED | OUTPATIENT
Start: 2020-04-24 | End: 2020-04-28 | Stop reason: HOSPADM

## 2020-04-24 RX ORDER — MIRTAZAPINE 15 MG/1
7.5 TABLET, FILM COATED ORAL NIGHTLY
Status: DISCONTINUED | OUTPATIENT
Start: 2020-04-24 | End: 2020-04-24 | Stop reason: ALTCHOICE

## 2020-04-24 RX ORDER — ACETAMINOPHEN 650 MG/1
650 SUPPOSITORY RECTAL EVERY 4 HOURS PRN
Status: DISCONTINUED | OUTPATIENT
Start: 2020-04-24 | End: 2020-04-28 | Stop reason: HOSPADM

## 2020-04-24 RX ORDER — SODIUM CHLORIDE 0.9 % (FLUSH) 0.9 %
10 SYRINGE (ML) INJECTION EVERY 12 HOURS SCHEDULED
Status: DISCONTINUED | OUTPATIENT
Start: 2020-04-24 | End: 2020-04-28 | Stop reason: HOSPADM

## 2020-04-24 RX ORDER — ONDANSETRON 2 MG/ML
4 INJECTION INTRAMUSCULAR; INTRAVENOUS EVERY 6 HOURS PRN
Status: DISCONTINUED | OUTPATIENT
Start: 2020-04-24 | End: 2020-04-28 | Stop reason: HOSPADM

## 2020-04-24 RX ORDER — PROMETHAZINE HYDROCHLORIDE 25 MG/1
12.5 TABLET ORAL EVERY 6 HOURS PRN
Status: DISCONTINUED | OUTPATIENT
Start: 2020-04-24 | End: 2020-04-28 | Stop reason: HOSPADM

## 2020-04-24 RX ADMIN — POTASSIUM CHLORIDE 40 MEQ: 1500 TABLET, EXTENDED RELEASE ORAL at 15:08

## 2020-04-24 RX ADMIN — FAMOTIDINE 20 MG: 20 TABLET, FILM COATED ORAL at 11:22

## 2020-04-24 RX ADMIN — ENOXAPARIN SODIUM 40 MG: 40 INJECTION SUBCUTANEOUS at 15:08

## 2020-04-24 RX ADMIN — PRAVASTATIN SODIUM 20 MG: 20 TABLET ORAL at 11:21

## 2020-04-24 RX ADMIN — ASPIRIN 81 MG: 81 TABLET, COATED ORAL at 11:22

## 2020-04-24 RX ADMIN — Medication 10 ML: at 11:23

## 2020-04-24 RX ADMIN — IOPAMIDOL 75 ML: 755 INJECTION, SOLUTION INTRAVENOUS at 09:26

## 2020-04-24 RX ADMIN — AMLODIPINE BESYLATE 10 MG: 5 TABLET ORAL at 15:08

## 2020-04-24 RX ADMIN — Medication 10 ML: at 20:51

## 2020-04-24 RX ADMIN — HYDROCHLOROTHIAZIDE 25 MG: 25 TABLET ORAL at 15:08

## 2020-04-24 ASSESSMENT — PAIN SCALES - GENERAL
PAINLEVEL_OUTOF10: 0
PAINLEVEL_OUTOF10: 0

## 2020-04-24 NOTE — PROGRESS NOTES
Pt back from CT of of Head and Neck. Just got MRI cklist done with pt's daughter Mal Jason. Will fax MRI ck list to MRI now.

## 2020-04-24 NOTE — PROGRESS NOTES
bilaterally. Full range of motion without deformity. +2 palpable pulses, equal bilaterally. Capillary refill brisk,< 3 seconds   Skin: No rashes or lesions. Warm/dry. Neurologic:  Baseline weakness on the left side, chronic for patient; no other neurological deficits noted. Neurovascularly intact without any focal sensory/motor deficits. Cranial nerves: II-XII intact, grossly non-focal. Alert and oriented x 3. Normal speech. Psychiatric:  Thought content appropriate, normal insight. Labs:   Recent Labs     04/23/20 2041 04/24/20  0510   WBC 5.4 5.8   HGB 13.9 13.3   HCT 41.7 39.7    202     Recent Labs     04/23/20 2041 04/24/20  0510    142   K 3.6 3.1*   CL 99 101   CO2 29 29   BUN 20 16   CREATININE 0.9 0.8   CALCIUM 9.7 9.7     Recent Labs     04/23/20 2041   AST 20   ALT 8*   BILITOT 0.4   ALKPHOS 117     Recent Labs     04/23/20 2041   INR 1.03     Recent Labs     04/23/20 2041   TROPONINI <0.01       Urinalysis:      Lab Results   Component Value Date    NITRU Negative 05/08/2019    WBCUA 3-5 07/13/2015    BACTERIA Rare 07/13/2015    RBCUA 0-2 07/13/2015    BLOODU Negative 05/08/2019    SPECGRAV 1.020 05/08/2019    GLUCOSEU Negative 05/08/2019       Radiology:  CTA HEAD NECK W CONTRAST   Final Result   Unremarkable CTA of the head and neck. CT HEAD WO CONTRAST   Final Result   No acute intracranial abnormality. XR CHEST PORTABLE   Final Result   Perihilar and bibasilar airspace opacity favored to represent congestive   changes and pulmonary edema accentuated by low lung volumes.          MRI brain without contrast    (Results Pending)           Assessment/Plan:    Active Hospital Problems    Diagnosis    Stroke-like symptoms [R29.90]     TIA  Follow-up MRI results  Pending neurology consultation  Patient is currently on aspirin and statin, will ask neurology for recommendation on escalating this therapy    Hypertension  We will start reintroducing home

## 2020-04-24 NOTE — CONSULTS
NEUROLOGY CONSULTATION     Patient's Name :   Aurora Strange        YOB: 1933                    Date of Consultation : 4/24/2020 3:27 PM    Referring Physician :  Johnathan Prakash MD    Reason for Consultation :  Memory impairment,?  TIA    HISTORY OF PRESENT ILLNESS      Aurora Strange is a 80 y.o. female   History was obtained from patient and from dictations in the chart. Patient states that she has had some short-term memory problems for several months but it seems to be much worse in the last 1 week. She would wake up from sleep and not know exactly where she was and will be confused for a while. She was concerned about this. She apparently had told the admitting doctor that she had some vague numbness on the right side and also some weakness but now she denies to me. She states that her right leg is always been weak from her right lumbar disc disease but denies any other TIA symptoms now. Patient has known lumbar spinal stenosis and has had trouble walking. She uses a walker. She states that she lives alone but her daughter checks on her. Her family has been taking care of her financial affairs for now. She has difficulty balancing her checkbook.   She stopped driving a while ago since she was getting lost.      REVIEW OF SYSTEMS     Denies any chest pain palpitations breathlessness abdominal pain fever or weight loss rest of the 14 system review was reviewed and was negative except for the symptoms noted above    Past Medical History:   Diagnosis Date    Arthritis     Hypertension     TIA (transient ischemic attack)      Family History   Problem Relation Age of Onset    High Blood Pressure Mother     Heart Disease Mother     High Blood Pressure Father     Cancer Sister     High Blood Pressure Sister     Diabetes Sister     Cancer Brother     Cancer Brother     Cancer Brother     Stroke Brother    [facility] nystagmus  V: Facial sensation is intact to pin prick and light touch  VII: Facial strength and movements: intact and symmetric smile,cheek puffing and eyebrow elevation  VIII: Hearing:  Intact to finger rub bilaterally  IX: Palate  elevation is symmetric  XI: Shoulder shrug is intact  XII: Tongue movements are normal  Motor:  Muscle tone and bulk are normal.   Strength is symmetrical 4/5 in all four extremities. Sensory: Intact to light touch and  pin prick in all four extremities  Coordination:  Normal  Finger to Nose and Heel to Shin bilaterally    . Reflexes:  DTR 1 and symmetric bilaterally  Plantar response: Flexor bilaterally  Gait: Gait could not be checked because of right leg pain Romberg: Could not be checked  Vascular: No carotid bruit bilaterally        DATA:  LABS:  General Labs:    CBC:   Lab Results   Component Value Date    WBC 5.8 04/24/2020    RBC 4.29 04/24/2020    HGB 13.3 04/24/2020    HCT 39.7 04/24/2020    MCV 92.6 04/24/2020    MCH 30.9 04/24/2020    MCHC 33.4 04/24/2020    RDW 13.6 04/24/2020     04/24/2020    MPV 8.5 04/24/2020     BMP:    Lab Results   Component Value Date     04/24/2020    K 3.1 04/24/2020     04/24/2020    CO2 29 04/24/2020    BUN 16 04/24/2020    LABALBU 4.1 04/23/2020    CREATININE 0.8 04/24/2020    CALCIUM 9.7 04/24/2020    GFRAA >60 04/24/2020    LABGLOM >60 04/24/2020    GLUCOSE 87 04/24/2020     RADIOLOGY REVIEW:  I have reviewed radiology image(s) and reports(s) of: MRI brain and CT angiogram    IMPRESSION :  I suspect the patient has late onset Alzheimer's dementia which is slowly getting worse. Patient is a rather poor historian and I am not convinced that she had a definite TIA at this time. She was worked up for possible TIA and her CT angiogram does not show any significant vascular disease.   Risk factors for cerebrovascular disease include hypertension and her age  MRI brain showed atrophy and some minimal chronic white matter changes

## 2020-04-24 NOTE — ED PROVIDER NOTES
As physician-in-triage, I performed a medical screening history and physical exam on [facility]. I also cared for and evaluated the patient in conjunction with the ED Advanced Practice Provider. All diagnostic, treatment, and disposition decisions were made by myself in conjunction with the advanced practice provider. For all further details of the patient's emergency department visit, please see the advanced practice provider's documentation. Presents the ER for evaluation of suspected TIA due to paresthesias of the right face and arm, no new significant drift, patient of symptoms have been greater than 12 hours, patient is not a candidate for thrombolysis due to time interval and low NIH stroke scale, no intracranial hemorrhage. No acute EKG changes. Prior history of left-sided deficit. Patient will be admitted for further evaluation of TIA. Antiplatelet agent will be given. Impression: Acute TIA.   Yue Lane MD  69/64/88 1723
that her right arm was numb. The patient states that she had the symptoms throughout the afternoon, EMS was called. However with the patient arrives to the ED she states that she is actually feeling fine. She has no memory issues she is able to recall events without difficulty. She is alert and oriented x3. Patient has no headaches. No visual changes. She has no numbness, tingling or weakness. She has no chest pain, shortness of breath or abdominal pain. She has no fever chills. No cough or congestion no urinary symptoms. Patient otherwise denies falling or hitting her head. She has no other complaints    On physical exam, patient does have baseline weakness noted on the left side, otherwise is unremarkable. As patient started with symptoms at 11 PM last night was her last known normal, and overall symptoms are improving, stroke team was not consulted. CBC shows no evidence of leukocytosis or anemia. CMP is unremarkable. Troponin is negative, BMP is normal.  Coags are normal.  CT of head is unremarkable. Patient was given an aspirin in the ED and she will need to be admitted for further care and evaluation, hospitalist was agreed for admission, patient is stable for admission       FINAL IMPRESSION      1.  TIA (transient ischemic attack)          DISPOSITION/PLAN   DISPOSITION Admitted 04/23/2020 10:07:28 PM      PATIENT REFERREDTO:  Angela Phan  [facility] [address] [id number]  [phone]            DISCHARGE MEDICATIONS:  New Prescriptions    No medications on file       DISCONTINUED MEDICATIONS:  Discontinued Medications    No medications on file              (Please note that portions of this note were completed with a voice recognition program.  Efforts were made to edit the dictations but occasionally words are mis-transcribed.)    Shellie Peters PA-C (electronically signed)            Shellie Peters PA-C  04/23/20 [id number]

## 2020-04-24 NOTE — H&P
81 MG EC tablet Take 1 tablet by mouth daily 5/12/19  Yes Richard Oliveira MD   amLODIPine (NORVASC) 10 MG tablet Take 10 mg by mouth daily   Yes Historical Provider, MD   pravastatin (PRAVACHOL) 20 MG tablet Take 20 mg by mouth daily   Yes Historical Provider, MD   hydrochlorothiazide (HYDRODIURIL) 25 MG tablet Take 1 tablet by mouth daily. 8/30/14  Yes Blanca Adler MD   mirtazapine (REMERON) 7.5 MG tablet Take 7.5 mg by mouth nightly    Historical Provider, MD   hydrALAZINE (APRESOLINE) 25 MG tablet Take 1 tablet by mouth every 8 hours 3/22/19   MD Mehrdad   potassium chloride (MICRO-K) 10 MEQ extended release capsule Take 10 mEq by mouth 2 times daily     Historical Provider, MD       Allergies:  Lisinopril    Social History:      TOBACCO:   reports that she has never smoked. She has never used smokeless tobacco.  ETOH:   reports no history of alcohol use. DRUGS:  reports no history of drug use. Family History:      Reviewed in detail positive as follows:        Problem Relation Age of Onset    High Blood Pressure Mother     Heart Disease Mother     High Blood Pressure Father     Cancer Sister     High Blood Pressure Sister     Diabetes Sister     Cancer Brother     Cancer Brother     Cancer Brother     Stroke Brother     Stroke Brother        REVIEW OF SYSTEMS:   Pertinent positives as noted in the HPI. All other systems reviewed and negative. PHYSICAL EXAM PERFORMED:    BP (!) 159/67   Pulse 92   Temp 97.8 °F (36.6 °C) (Oral)   Resp 16   Ht 5' 2\" (1.575 m)   Wt 171 lb (77.6 kg)   SpO2 97%   BMI 31.28 kg/m²     General appearance:  Awake, alert, no apparent distress  HEENT:  Normocephalic, atraumatic without obvious deformity. PERRL. EOM intact. Conjunctivae/corneas clear. Neck: Supple, with full range of motion. No JVD. Trachea midline. Respiratory:  Clear to auscultation bilaterally without rales, wheezes, or rhonchi. Normal respiratory effort.    Cardiovascular:

## 2020-04-25 LAB — URINE CULTURE, ROUTINE: NORMAL

## 2020-04-25 PROCEDURE — 97535 SELF CARE MNGMENT TRAINING: CPT

## 2020-04-25 PROCEDURE — 97110 THERAPEUTIC EXERCISES: CPT

## 2020-04-25 PROCEDURE — 6370000000 HC RX 637 (ALT 250 FOR IP): Performed by: PHYSICIAN ASSISTANT

## 2020-04-25 PROCEDURE — 6370000000 HC RX 637 (ALT 250 FOR IP): Performed by: INTERNAL MEDICINE

## 2020-04-25 PROCEDURE — 97116 GAIT TRAINING THERAPY: CPT

## 2020-04-25 PROCEDURE — 6360000002 HC RX W HCPCS: Performed by: PHYSICIAN ASSISTANT

## 2020-04-25 PROCEDURE — 99214 OFFICE O/P EST MOD 30 MIN: CPT | Performed by: PSYCHIATRY & NEUROLOGY

## 2020-04-25 PROCEDURE — 96372 THER/PROPH/DIAG INJ SC/IM: CPT

## 2020-04-25 PROCEDURE — 97165 OT EVAL LOW COMPLEX 30 MIN: CPT

## 2020-04-25 PROCEDURE — G0378 HOSPITAL OBSERVATION PER HR: HCPCS

## 2020-04-25 PROCEDURE — 97530 THERAPEUTIC ACTIVITIES: CPT

## 2020-04-25 PROCEDURE — 2580000003 HC RX 258: Performed by: PHYSICIAN ASSISTANT

## 2020-04-25 RX ADMIN — AMLODIPINE BESYLATE 10 MG: 5 TABLET ORAL at 09:55

## 2020-04-25 RX ADMIN — FAMOTIDINE 20 MG: 20 TABLET, FILM COATED ORAL at 09:54

## 2020-04-25 RX ADMIN — Medication 10 ML: at 09:56

## 2020-04-25 RX ADMIN — ENOXAPARIN SODIUM 40 MG: 40 INJECTION SUBCUTANEOUS at 09:54

## 2020-04-25 RX ADMIN — ASPIRIN 81 MG: 81 TABLET, COATED ORAL at 09:54

## 2020-04-25 RX ADMIN — Medication 10 ML: at 20:04

## 2020-04-25 RX ADMIN — HYDROCHLOROTHIAZIDE 25 MG: 25 TABLET ORAL at 09:55

## 2020-04-25 ASSESSMENT — PAIN SCALES - GENERAL
PAINLEVEL_OUTOF10: 0

## 2020-04-25 ASSESSMENT — PAIN SCALES - WONG BAKER
WONGBAKER_NUMERICALRESPONSE: 0

## 2020-04-25 NOTE — PROGRESS NOTES
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 Subjective: Patient seen and examined. States she feels back to her baseline. She states that she has intermittent episodes of confusion which she calls spells. Otherwise her memory appears more or less intact. She has difficulty formulation the nature of her complaints at the time of admission. Medications:  Reviewed    Infusion Medications   Scheduled Medications    sodium chloride flush  10 mL Intravenous 2 times per day    enoxaparin  40 mg Subcutaneous Daily    aspirin  81 mg Oral Daily    amLODIPine  10 mg Oral Daily    hydroCHLOROthiazide  25 mg Oral Daily    atorvastatin  40 mg Oral Daily    famotidine  20 mg Oral Daily     PRN Meds: sodium chloride flush, acetaminophen **OR** acetaminophen, magnesium hydroxide, promethazine **OR** ondansetron, labetalol, potassium chloride **OR** potassium alternative oral replacement **OR** potassium chloride      Intake/Output Summary (Last 24 hours) at 4/25/2020 1145  Last data filed at 4/25/2020 1008  Gross per 24 hour   Intake 300 ml   Output --   Net 300 ml       Physical Exam Performed:    BP (!) 137/59   Pulse 98   Temp 97.4 °F (36.3 °C) (Temporal)   Resp 18   Ht 5' 2\" (1.575 m)   Wt 173 lb (78.5 kg)   SpO2 93%   BMI 31.64 kg/m²     General appearance:  Awake, alert, no apparent distress  HEENT:  Normocephalic, atraumatic without obvious deformity. PERRL. EOM intact. Conjunctivae/corneas clear. Neck: Supple, with full range of motion. No JVD. Trachea midline. Respiratory:  Clear to auscultation bilaterally without rales, wheezes, or rhonchi. Normal respiratory effort.    Cardiovascular:  Regular rate and rhythm without murmurs, rubs or gallops. Abdomen: Soft, NT, ND, without rebound or guarding. Normal bowel sounds. Extremities:  No clubbing, cyanosis, or edema bilaterally. Full range of motion without deformity. +2 palpable pulses, equal bilaterally. Capillary refill brisk,< 3 seconds   Skin: No rashes or lesions. Warm/dry. Neurologic:  Baseline weakness on the left side, chronic for patient; no other neurological deficits noted. Neurovascularly intact without any focal sensory/motor deficits. Cranial nerves: II-XII intact, grossly non-focal. Alert and oriented x 3. Normal speech. Psychiatric:  Thought content appropriate, normal insight. Labs:   Recent Labs     04/23/20 2041 04/24/20  0510   WBC 5.4 5.8   HGB 13.9 13.3   HCT 41.7 39.7    202     Recent Labs     04/23/20 2041 04/24/20  0510    142   K 3.6 3.1*   CL 99 101   CO2 29 29   BUN 20 16   CREATININE 0.9 0.8   CALCIUM 9.7 9.7     Recent Labs     04/23/20  2041   AST 20   ALT 8*   BILITOT 0.4   ALKPHOS 117     Recent Labs     04/23/20  2041   INR 1.03     Recent Labs     04/23/20 2041   TROPONINI <0.01       Urinalysis:      Lab Results   Component Value Date    NITRU Negative 04/24/2020    WBCUA 5 04/24/2020    BACTERIA Rare 07/13/2015    RBCUA 1 04/24/2020    BLOODU Negative 04/24/2020    SPECGRAV >1.030 04/24/2020    GLUCOSEU Negative 04/24/2020       Radiology:  MRI brain without contrast   Final Result   Cerebral atrophy. Mild chronic small vessel ischemic changes. No acute   brain parenchymal abnormality. CTA HEAD NECK W CONTRAST   Final Result   Unremarkable CTA of the head and neck. CT HEAD WO CONTRAST   Final Result   No acute intracranial abnormality. XR CHEST PORTABLE   Final Result   Perihilar and bibasilar airspace opacity favored to represent congestive   changes and pulmonary edema accentuated by low lung volumes.                  Assessment/Plan:    Active Hospital Problems    Diagnosis   

## 2020-04-25 NOTE — PROGRESS NOTES
Seeing by PT/OT today. Patient very confused at the beginning of shift but getting more oriented but forgetful. However as the shift progress her confusion increase, patient continuously talk about her desire to go home and trying to request a taxi.

## 2020-04-25 NOTE — PROGRESS NOTES
Physical Therapy  Facility/Department: [facility] ICU  Daily Treatment Note  NAME: Janet Sutherland  : 1933  MRN: [id number]    Date of Service: 2020    Discharge Recommendations: Janet Sutherland scored a 20/24 on the AM-PAC short mobility form. Current research shows that an AM-PAC score of 18 or greater is typically associated with a discharge to the patient's home setting. Based on the patients AM-PAC score and their current functional mobility deficits, it is recommended that the patient have 2-3 sessions per week of Physical Therapy at d/c to increase the patients independence. If patient discharges prior to next session this note will serve as a discharge summary. Please see below for the latest assessment towards goals. HOME HEALTH CARE: LEVEL 3 SAFETY     - Initial home health evaluation to occur within 24-48 hours, in patient home   - Therapy evaluations in home within 24-48 hours of discharge; including DME and home safety   - Frontload therapy 5 days, then 3x a week   - Therapy to evaluate if patient has [address] needs for personal care   -  evaluation within 24-48 hours, includes evaluation of resources and insurance to determine AL, [place], LTC, and Medicaid options         PT Equipment Recommendations  Equipment Needed: Yes  Mobility Devices: Celestia Panda: Rolling    Assessment   Body structures, Functions, Activity limitations: Decreased cognition;Decreased balance;Decreased endurance;Decreased strength;Decreased functional mobility   Assessment: Patient reports being MOD I at home w/ cane prior to admission. She presently requires SBA for safety although no LOB occurred. She verbalizes she does not feel she can safely ambulate without the walker indicating balance deficits. She also demonstrates cognitive impairments. Her brain MRI is negative for an acute infarct so it appears her deficits are not new.   Patient would benefit from ongoing therapy at d/c as well as 24 hour assist and supervision given her cognitive deficits. Treatment Diagnosis: impaired balance, ambulation deficits  Prognosis: Good  Decision Making: Low Complexity  History: As above. Exam: MMT, ROM, functional mobility assessment  Clinical Presentation: Stable. PT Education: Goals;PT Role;Plan of Care;Transfer Training;Gait Training;Functional Mobility Training  Patient Education: Patient verbalized understanding but will need reinforcement given cognitive deficits. Barriers to Learning: cognition  REQUIRES PT FOLLOW UP: Yes  Activity Tolerance  Activity Tolerance: Patient Tolerated treatment well     Patient Diagnosis(es): The encounter diagnosis was TIA (transient ischemic attack). has a past medical history of Arthritis, Hypertension, and TIA (transient ischemic attack). has a past surgical history that includes Hysterectomy and Cholecystectomy. Restrictions  Restrictions/Precautions  Restrictions/Precautions: Fall Risk(high fall risk, up with assist)  Required Braces or Orthoses?: No  Position Activity Restriction  Other position/activity restrictions: Cash Mcgregor is a 80 y.o. female who presents to the emergency department today for evaluation for right-sided numbness, and memory issues. The patient has a history of hypertension, TIA, she is on a baby aspirin but no other blood thinners. She has had a stroke in the past and she has baseline leg on the left side. The patient states that she went to bed around 11 PM last night, and this was her last known normal.  The patient states that when she woke up at 6 AM she felt that she was forgetting certain things, and she felt that her right arm was numb. The patient states that she had the symptoms throughout the afternoon, EMS was called. However with the patient arrives to the ED she states that she is actually feeling fine. She has no memory issues she is able to recall events without difficulty.   She is alert and oriented goals: Discharge. Short term goal 1: Patient will perform bed mobility MOD I. Short term goal 2: Patient will perform bed-chair transfer MOD I w/ LRAD. Short term goal 3: Patient will ambulate 150' MOD I w/ LRAD. Patient Goals   Patient goals : Return home. Plan    Plan  Times per week: 5-7  Times per day: Daily  Current Treatment Recommendations: Strengthening, Functional Mobility Training, Equipment Evaluation, Education, & procurement, Transfer Training, Gait Training, Safety Education & Training, Balance Training, Endurance Training, Patient/Caregiver Education & Training  Safety Devices  Type of devices:  All fall risk precautions in place, Call light within reach, Gait belt, Patient at risk for falls, Nurse notified, Chair alarm in place, Left in chair  Restraints  Initially in place: No     Therapy Time   Individual Concurrent Group Co-treatment   Time In 1255         Time Out 1322         Minutes 27         Timed Code Treatment Minutes: [address] Cristiano, PT

## 2020-04-25 NOTE — PROGRESS NOTES
deficits. Treatment Diagnosis: Decreased functional mobiltiy, ADLs, cogntion, safety, balance associated with TIA  Prognosis: Good;Fair  Decision Making: Low Complexity  OT Education: OT Role;Plan of Care;IADL Safety  Patient Education: Patient verbalizes understanding and verbalizes memory deficits, however does need continued reinforcement for safety and complete understanding. REQUIRES OT FOLLOW UP: Yes  Activity Tolerance  Activity Tolerance: Patient Tolerated treatment well;Treatment limited secondary to decreased cognition  Safety Devices  Safety Devices in place: Yes  Type of devices: All fall risk precautions in place; Bed alarm in place;Call light within reach; Left in bed;Patient at risk for falls;Nurse notified;Gait belt           Patient Diagnosis(es): The encounter diagnosis was TIA (transient ischemic attack). has a past medical history of Arthritis, Hypertension, and TIA (transient ischemic attack). has a past surgical history that includes Hysterectomy and Cholecystectomy. Treatment Diagnosis: Decreased functional mobiltiy, ADLs, cogntion, safety, balance associated with TIA      Restrictions  Restrictions/Precautions  Restrictions/Precautions: Fall Risk(high fall risk, up with assist)  Required Braces or Orthoses?: No  Position Activity Restriction  Other position/activity restrictions: Madisyn Cross is a 80 y.o. female who presents to the emergency department today for evaluation for right-sided numbness, and memory issues. The patient has a history of hypertension, TIA, she is on a baby aspirin but no other blood thinners. She has had a stroke in the past and she has baseline leg on the left side. The patient states that she went to bed around 11 PM last night, and this was her last known normal.  The patient states that when she woke up at 6 AM she felt that she was forgetting certain things, and she felt that her right arm was numb.   The patient states that she had the symptoms

## 2020-04-25 NOTE — PROGRESS NOTES
Shift assessment done, see flow sheets. Medication administrated per MAR. Fall precautions in place, call light in reach. Patient alert and oriented, denies paina nd needs at this time.  The care plan reviewed and mutually agree with the patient will continue to monitor

## 2020-04-25 NOTE — CARE COORDINATION
LM for daughter Heron Abdi ([phone]) to call back about discharge planning. Therapy scores do not qualify her for Medicare SNF benefits. She does have Medicaid and may meet criteria for SNF through those benefits if family interested.     Edith Berry RN BSN  Case Management

## 2020-04-25 NOTE — PROGRESS NOTES
Called daughter Kaley Dia per patient request, due to patient confusion and trying to go home.    Daughter updated on patient status

## 2020-04-25 NOTE — PROGRESS NOTES
04/24/2020    MCV 92.6 04/24/2020    MCH 30.9 04/24/2020    MCHC 33.4 04/24/2020    RDW 13.6 04/24/2020     04/24/2020    MPV 8.5 04/24/2020     BMP:    Lab Results   Component Value Date     04/24/2020    K 3.1 04/24/2020     04/24/2020    CO2 29 04/24/2020    BUN 16 04/24/2020    LABALBU 4.1 04/23/2020    CREATININE 0.8 04/24/2020    CALCIUM 9.7 04/24/2020    GFRAA >60 04/24/2020    LABGLOM >60 04/24/2020    GLUCOSE 87 04/24/2020     RADIOLOGY REVIEW:  I have reviewed radiology image(s) and reports(s) of: MRI brain and CT angiogram      IMPRESSION :  Late onset Alzheimer's type dementia  Possible TIA but no clear findings  MRI brain showed atrophy and chronic white matter changes  Cerebrovascular risk factors include hypertension and her age  TSH and B12 levels were normal  Patient Active Problem List   Diagnosis    Angioedema    Bradycardia    Fracture of humerus, left, closed    Left shoulder pain    Syncope    Essential hypertension    Acute [facility]A [facility]cerebrovascular accident) ([facility] [address]    Aphasia    Right leg weakness    Lumbar disc displacement without myelopathy    HTN (hypertension), benign    Dyslipidemia    Stroke-like symptoms       RECOMMENDATIONS :  Discussed with patient  Continue antiplatelet therapy  Consider starting Aricept as an outpatient. I will be happy to follow her for the dementia if her primary care physician feels it would be helpful. Please note a portion of this chart was generated using dragon dictation software. Although every effort was made to ensure the accuracy of this automated transcription, some errors in transcription may have occurred.          Evy Mercer M.D.

## 2020-04-26 LAB
ANION GAP SERPL CALCULATED.3IONS-SCNC: 15 MMOL/L (ref 3–16)
BUN BLDV-MCNC: 16 MG/DL (ref 7–20)
CALCIUM SERPL-MCNC: 9.8 MG/DL (ref 8.3–10.6)
CHLORIDE BLD-SCNC: 101 MMOL/L (ref 99–110)
CO2: 24 MMOL/L (ref 21–32)
CREAT SERPL-MCNC: 0.7 MG/DL (ref 0.6–1.2)
GFR AFRICAN AMERICAN: >60
GFR NON-AFRICAN AMERICAN: >60
GLUCOSE BLD-MCNC: 125 MG/DL (ref 70–99)
HCT VFR BLD CALC: 43.1 % (ref 36–48)
HEMOGLOBIN: 14.2 G/DL (ref 12–16)
MCH RBC QN AUTO: 30.7 PG (ref 26–34)
MCHC RBC AUTO-ENTMCNC: 33 G/DL (ref 31–36)
MCV RBC AUTO: 93.1 FL (ref 80–100)
PDW BLD-RTO: 13.6 % (ref 12.4–15.4)
PLATELET # BLD: 199 K/UL (ref 135–450)
PMV BLD AUTO: 8.6 FL (ref 5–10.5)
POTASSIUM SERPL-SCNC: 3.2 MMOL/L (ref 3.5–5.1)
RBC # BLD: 4.63 M/UL (ref 4–5.2)
SODIUM BLD-SCNC: 140 MMOL/L (ref 136–145)
WBC # BLD: 7.6 K/UL (ref 4–11)

## 2020-04-26 PROCEDURE — 2580000003 HC RX 258: Performed by: PHYSICIAN ASSISTANT

## 2020-04-26 PROCEDURE — G0378 HOSPITAL OBSERVATION PER HR: HCPCS

## 2020-04-26 PROCEDURE — 6370000000 HC RX 637 (ALT 250 FOR IP): Performed by: PHYSICIAN ASSISTANT

## 2020-04-26 PROCEDURE — 96372 THER/PROPH/DIAG INJ SC/IM: CPT

## 2020-04-26 PROCEDURE — 6360000002 HC RX W HCPCS: Performed by: PHYSICIAN ASSISTANT

## 2020-04-26 PROCEDURE — 36415 COLL VENOUS BLD VENIPUNCTURE: CPT

## 2020-04-26 PROCEDURE — 6370000000 HC RX 637 (ALT 250 FOR IP): Performed by: INTERNAL MEDICINE

## 2020-04-26 PROCEDURE — 85027 COMPLETE CBC AUTOMATED: CPT

## 2020-04-26 PROCEDURE — 99214 OFFICE O/P EST MOD 30 MIN: CPT | Performed by: PSYCHIATRY & NEUROLOGY

## 2020-04-26 PROCEDURE — 80048 BASIC METABOLIC PNL TOTAL CA: CPT

## 2020-04-26 RX ADMIN — ASPIRIN 81 MG: 81 TABLET, COATED ORAL at 09:34

## 2020-04-26 RX ADMIN — FAMOTIDINE 20 MG: 20 TABLET, FILM COATED ORAL at 09:34

## 2020-04-26 RX ADMIN — Medication 10 ML: at 09:35

## 2020-04-26 RX ADMIN — HYDROCHLOROTHIAZIDE 25 MG: 25 TABLET ORAL at 09:34

## 2020-04-26 RX ADMIN — POTASSIUM CHLORIDE 40 MEQ: 1500 TABLET, EXTENDED RELEASE ORAL at 17:08

## 2020-04-26 RX ADMIN — Medication 10 ML: at 21:02

## 2020-04-26 RX ADMIN — ENOXAPARIN SODIUM 40 MG: 40 INJECTION SUBCUTANEOUS at 09:33

## 2020-04-26 RX ADMIN — AMLODIPINE BESYLATE 10 MG: 5 TABLET ORAL at 09:33

## 2020-04-26 ASSESSMENT — PAIN SCALES - GENERAL
PAINLEVEL_OUTOF10: 0

## 2020-04-26 ASSESSMENT — PAIN SCALES - WONG BAKER
WONGBAKER_NUMERICALRESPONSE: 0

## 2020-04-26 NOTE — PROGRESS NOTES
Shift assessment done, see flow sheet. Medication administrated per MAR. Fall precaution in place, call light in reach. Patient Alert and oriented but forgetful and confuse requesting to go home continuously.  Will continue to monitor

## 2020-04-26 NOTE — PROGRESS NOTES
Pt up w/o calling to restroom, assisted w/ walker. Voided x1. returned to bed. Vitals obtained, reassessment complete. When asked pt if she knew where she was she replied \"I do but I don't. Pt more calm and cooperative and returned to bed easily. Offered warm blanket & pt declined. Denies acute needs. Call light in reach. Safety precautions in place.

## 2020-04-26 NOTE — PROGRESS NOTES
Hospitalist Progress Note      PCP: Lilia Velásquez    Date of Admission: 4/23/2020    Chief Complaint: Right-sided weakness and confusion    Hospital Course: 26-year-old female with apparent history of CVA with residual right-sided weakness presented to the ED complaining of worsened right-sided numbness/weakness and confusion. Subjective: Patient seen and examined. States she feels back to her baseline. She states that she has intermittent episodes of confusion which she calls spells. Otherwise her memory appears more or less intact. She has difficulty formulation the nature of her complaints at the time of admission. Medications:  Reviewed    Infusion Medications   Scheduled Medications    sodium chloride flush  10 mL Intravenous 2 times per day    enoxaparin  40 mg Subcutaneous Daily    aspirin  81 mg Oral Daily    amLODIPine  10 mg Oral Daily    hydroCHLOROthiazide  25 mg Oral Daily    atorvastatin  40 mg Oral Daily    famotidine  20 mg Oral Daily     PRN Meds: sodium chloride flush, acetaminophen **OR** acetaminophen, magnesium hydroxide, promethazine **OR** ondansetron, labetalol, potassium chloride **OR** potassium alternative oral replacement **OR** potassium chloride      Intake/Output Summary (Last 24 hours) at 4/26/2020 0827  Last data filed at 4/25/2020 1849  Gross per 24 hour   Intake 660 ml   Output --   Net 660 ml       Physical Exam Performed:    BP (!) 143/60   Pulse 86   Temp 97.6 °F (36.4 °C) (Temporal)   Resp 20   Ht 5' 2\" (1.575 m)   Wt 165 lb 6.4 oz (75 kg)   SpO2 99%   BMI 30.25 kg/m²     General appearance:  Awake, alert, no apparent distress  HEENT:  Normocephalic, atraumatic without obvious deformity. PERRL. EOM intact. Conjunctivae/corneas clear. Neck: Supple, with full range of motion. No JVD. Trachea midline. Respiratory:  Clear to auscultation bilaterally without rales, wheezes, or rhonchi. Normal respiratory effort.    Cardiovascular:  Regular rate and rhythm without murmurs, rubs or gallops. Abdomen: Soft, NT, ND, without rebound or guarding. Normal bowel sounds. Extremities:  No clubbing, cyanosis, or edema bilaterally. Full range of motion without deformity. +2 palpable pulses, equal bilaterally. Capillary refill brisk,< 3 seconds   Skin: No rashes or lesions. Warm/dry. Neurologic:  Baseline weakness on the left side, chronic for patient; no other neurological deficits noted. Neurovascularly intact without any focal sensory/motor deficits. Cranial nerves: II-XII intact, grossly non-focal. Alert and oriented x 3. Normal speech. Psychiatric:  Thought content appropriate, normal insight. Labs:   Recent Labs     04/23/20 2041 04/24/20  0510   WBC 5.4 5.8   HGB 13.9 13.3   HCT 41.7 39.7    202     Recent Labs     04/23/20 2041 04/24/20  0510    142   K 3.6 3.1*   CL 99 101   CO2 29 29   BUN 20 16   CREATININE 0.9 0.8   CALCIUM 9.7 9.7     Recent Labs     04/23/20  2041   AST 20   ALT 8*   BILITOT 0.4   ALKPHOS 117     Recent Labs     04/23/20  2041   INR 1.03     Recent Labs     04/23/20 2041   TROPONINI <0.01       Urinalysis:      Lab Results   Component Value Date    NITRU Negative 04/24/2020    WBCUA 5 04/24/2020    BACTERIA Rare 07/13/2015    RBCUA 1 04/24/2020    BLOODU Negative 04/24/2020    SPECGRAV >1.030 04/24/2020    GLUCOSEU Negative 04/24/2020       Radiology:  MRI brain without contrast   Final Result   Cerebral atrophy. Mild chronic small vessel ischemic changes. No acute   brain parenchymal abnormality. CTA HEAD NECK W CONTRAST   Final Result   Unremarkable CTA of the head and neck. CT HEAD WO CONTRAST   Final Result   No acute intracranial abnormality. XR CHEST PORTABLE   Final Result   Perihilar and bibasilar airspace opacity favored to represent congestive   changes and pulmonary edema accentuated by low lung volumes.                  Assessment/Plan:    Active Hospital Problems    Diagnosis    Stroke-like symptoms [R29.90]     TIA  Follow-up MRI results  Neurology consultation appreciated- questionable TIA, okay to DC with aspirin and statin. Likely late onset Alzheimer's, recommend starting Alzheimer's medications as outpatient. Patient is currently on aspirin and statin    Hypertension  We will start reintroducing home medications    Hyperlipidemia    Obesity    Patient is 40-year-old female with onset of Alzheimer's dementia who lives by herself and has episodes of confusion.  consulted to determine safe disposition.       DVT Prophylaxis: Lovenox  Diet: DIET GENERAL;  Code Status: Full Code    Electronically signed by Moose Reed MD on 4/26/2020 at 8:27 AM

## 2020-04-27 PROBLEM — G30.1 LATE ONSET ALZHEIMER'S DISEASE WITHOUT BEHAVIORAL DISTURBANCE (HCC): Status: ACTIVE | Noted: 2020-04-27

## 2020-04-27 PROBLEM — F02.80 LATE ONSET ALZHEIMER'S DISEASE WITHOUT BEHAVIORAL DISTURBANCE (HCC): Status: ACTIVE | Noted: 2020-04-27

## 2020-04-27 PROBLEM — E87.6 HYPOKALEMIA: Status: ACTIVE | Noted: 2020-04-27

## 2020-04-27 LAB
LEFT VENTRICULAR EJECTION FRACTION HIGH VALUE: 70 %
LEFT VENTRICULAR EJECTION FRACTION MODE: NORMAL
LV EF: 70 %
LVEF MODALITY: NORMAL

## 2020-04-27 PROCEDURE — 97535 SELF CARE MNGMENT TRAINING: CPT

## 2020-04-27 PROCEDURE — 6360000002 HC RX W HCPCS: Performed by: PHYSICIAN ASSISTANT

## 2020-04-27 PROCEDURE — 96372 THER/PROPH/DIAG INJ SC/IM: CPT

## 2020-04-27 PROCEDURE — G0378 HOSPITAL OBSERVATION PER HR: HCPCS

## 2020-04-27 PROCEDURE — 2580000003 HC RX 258: Performed by: PHYSICIAN ASSISTANT

## 2020-04-27 PROCEDURE — 6370000000 HC RX 637 (ALT 250 FOR IP): Performed by: PHYSICIAN ASSISTANT

## 2020-04-27 PROCEDURE — 6370000000 HC RX 637 (ALT 250 FOR IP): Performed by: INTERNAL MEDICINE

## 2020-04-27 RX ADMIN — HYDROCHLOROTHIAZIDE 25 MG: 25 TABLET ORAL at 08:39

## 2020-04-27 RX ADMIN — AMLODIPINE BESYLATE 10 MG: 5 TABLET ORAL at 08:38

## 2020-04-27 RX ADMIN — DESMOPRESSIN ACETATE 40 MG: 0.2 TABLET ORAL at 08:38

## 2020-04-27 RX ADMIN — ENOXAPARIN SODIUM 40 MG: 40 INJECTION SUBCUTANEOUS at 08:38

## 2020-04-27 RX ADMIN — ASPIRIN 81 MG: 81 TABLET, COATED ORAL at 08:38

## 2020-04-27 RX ADMIN — FAMOTIDINE 20 MG: 20 TABLET, FILM COATED ORAL at 08:38

## 2020-04-27 RX ADMIN — Medication 10 ML: at 09:08

## 2020-04-27 ASSESSMENT — PAIN SCALES - GENERAL
PAINLEVEL_OUTOF10: 0

## 2020-04-27 NOTE — PROGRESS NOTES
progressing  Short term goal 5: Mod I for problem solving ADL tasks- not met, progressing  Long term goals  Time Frame for Long term goals : LTG=STG       Therapy Time   Individual Concurrent Group Co-treatment   Time In 1405         Time Out 1428         Minutes 23         Timed Code Treatment Minutes: [address], [address] [place], [place] [address]

## 2020-04-27 NOTE — CARE COORDINATION
Discharge Planning Assessment    RN/SW discharge planner met with patient/ (and family member) to discuss reason for admission, current living situation, and potential needs at the time of discharge    Demographics/Insurance verified- Yes    Current type of dwelling: apartment    Living arrangements: alone    Level of function/Support: Dementia noted, family support/ [employer]- care medicaid overnight    PCP:Kolton Virgen    DME:  Uses walker, and cane    Active with any community resources/agencies/skilled home care: [facility] , [place] Rashid, [address] with PT/ OT S3 tomorrow with [facility]. Transportation at the time of discharge: Awaiting callback from daughter, [phone] to transport patient home with services if medically indicated pending a home care order. Called patient's daughter to learn if she has a [facility] agency preference.

## 2020-04-27 NOTE — DISCHARGE INSTR - COC
Status     None to display          Nurse Assessment:  Last Vital Signs: BP (!) 170/70   Pulse 63   Temp 97.4 °F (36.3 °C) (Temporal)   Resp 18   Ht 5' 2\" (1.575 m)   Wt 166 lb (75.3 kg)   SpO2 99%   BMI 30.36 kg/m²     Last documented pain score (0-10 scale): Pain Level: 0  Last Weight:   Wt Readings from Last 1 Encounters:   20 166 lb (75.3 kg)     Mental Status:  {IP PT MENTAL STATUS:}    IV Access:  { EDUARDO IV ACCESS:539055648}    Nursing Mobility/ADLs:  Walking   {CHP DME EJSR:014838355}  Transfer  {CHP DME KWJD:509471852}  Bathing  {CHP DME QJWQ:192772128}  Dressing  {CHP DME UKLO:187529478}  Toileting  {CHP DME XJLR:356537358}  Feeding  {[facility] DME GRAX:688817368}  Med Admin  {[facility] DME HORB:080322913}  Med Delivery   { EDUARDO MED Delivery:337697772}    Wound Care Documentation and Therapy:        Elimination:  Continence:   · Bowel: {YES / B}  · Bladder: {YES / TE:97441}  Urinary Catheter: {Urinary Catheter:069335056}   Colostomy/Ileostomy/Ileal Conduit: {YES / :40295}       Date of Last BM: ***    Intake/Output Summary (Last 24 hours) at 2020 1516  Last data filed at 2020 0819  Gross per 24 hour   Intake 650 ml   Output --   Net 650 ml     I/O last 3 completed shifts:   In: 0 [P.O.:650]  Out: -     Safety Concerns:     [address] [employer] Safety Concerns:930160267}    Impairments/Disabilities:      [address] [employer] Impairments/Disabilities:989165776}    Nutrition Therapy:  Current Nutrition Therapy:   [address] [employer] Diet List:512490358}    Routes of Feeding: {CHP DME Other Feedings:854885706}  Liquids: {Slp liquid thickness:41271}  Daily Fluid Restriction: {CHP DME Yes amt example:567544070}  Last Modified Barium Swallow with Video (Video Swallowing Test): {Done Not Done SAJE:579187311}    Treatments at the Time of Hospital Discharge:   Respiratory Treatments: ***  Oxygen Therapy:  {Therapy; copd oxygen:43960}  Ventilator:    {MH CC Vent [facility]:990517561}    Rehab Therapies:  Skilled Nursing, Physical requires Home Care for greater 30 days.      Update Admission H&P: No change in H&P    PHYSICIAN SIGNATURE:  Electronically signed by Sheryl Kahn MD on 4/28/20 at 2:52 PM EDT

## 2020-04-27 NOTE — PROGRESS NOTES
Pt confused at times. Able to walk to the BR with minimal assist.  Gait improved . Appetite good for meals.

## 2020-04-27 NOTE — PLAN OF CARE
Problem: Daily Care:  Goal: Daily care needs are met  Description: Daily care needs are met  Outcome: Ongoing  Note: Nurse attempted to do NIHSS assessment. When nurse entered that room patient was yawning eyes were open and patient was moving both upper extremities. Patient was asked orientation questions and she answered name and date of birth correctly. When patient was asked to perform further assessment she asked \"can I please just sleep right now? Everything is the same. \"  Patient was left alone in order to sleep at this time. Will continue to monitor needs.

## 2020-04-28 VITALS
SYSTOLIC BLOOD PRESSURE: 127 MMHG | BODY MASS INDEX: 30.73 KG/M2 | HEIGHT: 62 IN | TEMPERATURE: 96.9 F | RESPIRATION RATE: 18 BRPM | WEIGHT: 167 LBS | DIASTOLIC BLOOD PRESSURE: 71 MMHG | HEART RATE: 81 BPM | OXYGEN SATURATION: 98 %

## 2020-04-28 LAB
ANION GAP SERPL CALCULATED.3IONS-SCNC: 10 MMOL/L (ref 3–16)
BASOPHILS ABSOLUTE: 0 K/UL (ref 0–0.2)
BASOPHILS RELATIVE PERCENT: 0.5 %
BUN BLDV-MCNC: 16 MG/DL (ref 7–20)
CALCIUM SERPL-MCNC: 9.5 MG/DL (ref 8.3–10.6)
CHLORIDE BLD-SCNC: 102 MMOL/L (ref 99–110)
CO2: 29 MMOL/L (ref 21–32)
CREAT SERPL-MCNC: 0.8 MG/DL (ref 0.6–1.2)
EOSINOPHILS ABSOLUTE: 0.3 K/UL (ref 0–0.6)
EOSINOPHILS RELATIVE PERCENT: 5.6 %
GFR AFRICAN AMERICAN: >60
GFR NON-AFRICAN AMERICAN: >60
GLUCOSE BLD-MCNC: 103 MG/DL (ref 70–99)
HCT VFR BLD CALC: 41.2 % (ref 36–48)
HEMOGLOBIN: 13.8 G/DL (ref 12–16)
LYMPHOCYTES ABSOLUTE: 1.5 K/UL (ref 1–5.1)
LYMPHOCYTES RELATIVE PERCENT: 28.2 %
MCH RBC QN AUTO: 31 PG (ref 26–34)
MCHC RBC AUTO-ENTMCNC: 33.4 G/DL (ref 31–36)
MCV RBC AUTO: 92.8 FL (ref 80–100)
MONOCYTES ABSOLUTE: 0.5 K/UL (ref 0–1.3)
MONOCYTES RELATIVE PERCENT: 10 %
NEUTROPHILS ABSOLUTE: 2.9 K/UL (ref 1.7–7.7)
NEUTROPHILS RELATIVE PERCENT: 55.7 %
PDW BLD-RTO: 13.5 % (ref 12.4–15.4)
PLATELET # BLD: 206 K/UL (ref 135–450)
PMV BLD AUTO: 8.7 FL (ref 5–10.5)
POTASSIUM SERPL-SCNC: 3 MMOL/L (ref 3.5–5.1)
POTASSIUM SERPL-SCNC: 3.6 MMOL/L (ref 3.5–5.1)
RBC # BLD: 4.44 M/UL (ref 4–5.2)
SODIUM BLD-SCNC: 141 MMOL/L (ref 136–145)
WBC # BLD: 5.3 K/UL (ref 4–11)

## 2020-04-28 PROCEDURE — 96376 TX/PRO/DX INJ SAME DRUG ADON: CPT

## 2020-04-28 PROCEDURE — 85025 COMPLETE CBC W/AUTO DIFF WBC: CPT

## 2020-04-28 PROCEDURE — 6370000000 HC RX 637 (ALT 250 FOR IP): Performed by: INTERNAL MEDICINE

## 2020-04-28 PROCEDURE — G0378 HOSPITAL OBSERVATION PER HR: HCPCS

## 2020-04-28 PROCEDURE — 97116 GAIT TRAINING THERAPY: CPT

## 2020-04-28 PROCEDURE — 6370000000 HC RX 637 (ALT 250 FOR IP): Performed by: PHYSICIAN ASSISTANT

## 2020-04-28 PROCEDURE — 93306 TTE W/DOPPLER COMPLETE: CPT

## 2020-04-28 PROCEDURE — 36415 COLL VENOUS BLD VENIPUNCTURE: CPT

## 2020-04-28 PROCEDURE — 96372 THER/PROPH/DIAG INJ SC/IM: CPT

## 2020-04-28 PROCEDURE — 6360000002 HC RX W HCPCS: Performed by: PHYSICIAN ASSISTANT

## 2020-04-28 PROCEDURE — 84132 ASSAY OF SERUM POTASSIUM: CPT

## 2020-04-28 PROCEDURE — U0003 INFECTIOUS AGENT DETECTION BY NUCLEIC ACID (DNA OR RNA); SEVERE ACUTE RESPIRATORY SYNDROME CORONAVIRUS 2 (SARS-COV-2) (CORONAVIRUS DISEASE [COVID-19]), AMPLIFIED PROBE TECHNIQUE, MAKING USE OF HIGH THROUGHPUT TECHNOLOGIES AS DESCRIBED BY CMS-2020-01-R: HCPCS

## 2020-04-28 PROCEDURE — 6360000002 HC RX W HCPCS: Performed by: INTERNAL MEDICINE

## 2020-04-28 PROCEDURE — 2580000003 HC RX 258: Performed by: PHYSICIAN ASSISTANT

## 2020-04-28 PROCEDURE — 80048 BASIC METABOLIC PNL TOTAL CA: CPT

## 2020-04-28 PROCEDURE — 96374 THER/PROPH/DIAG INJ IV PUSH: CPT

## 2020-04-28 PROCEDURE — 97530 THERAPEUTIC ACTIVITIES: CPT

## 2020-04-28 RX ORDER — SODIUM CHLORIDE 9 MG/ML
INJECTION, SOLUTION INTRAVENOUS
Status: DISCONTINUED
Start: 2020-04-28 | End: 2020-04-28 | Stop reason: HOSPADM

## 2020-04-28 RX ORDER — POTASSIUM CHLORIDE 20 MEQ/1
40 TABLET, EXTENDED RELEASE ORAL ONCE
Status: COMPLETED | OUTPATIENT
Start: 2020-04-28 | End: 2020-04-28

## 2020-04-28 RX ADMIN — POTASSIUM CHLORIDE 10 MEQ: 7.46 INJECTION, SOLUTION INTRAVENOUS at 10:01

## 2020-04-28 RX ADMIN — AMLODIPINE BESYLATE 10 MG: 5 TABLET ORAL at 08:49

## 2020-04-28 RX ADMIN — DESMOPRESSIN ACETATE 40 MG: 0.2 TABLET ORAL at 08:50

## 2020-04-28 RX ADMIN — ENOXAPARIN SODIUM 40 MG: 40 INJECTION SUBCUTANEOUS at 08:50

## 2020-04-28 RX ADMIN — POTASSIUM CHLORIDE 10 MEQ: 7.46 INJECTION, SOLUTION INTRAVENOUS at 11:46

## 2020-04-28 RX ADMIN — Medication 10 ML: at 08:50

## 2020-04-28 RX ADMIN — ASPIRIN 81 MG: 81 TABLET, COATED ORAL at 08:50

## 2020-04-28 RX ADMIN — FAMOTIDINE 20 MG: 20 TABLET, FILM COATED ORAL at 08:49

## 2020-04-28 RX ADMIN — HYDROCHLOROTHIAZIDE 25 MG: 25 TABLET ORAL at 08:50

## 2020-04-28 RX ADMIN — POTASSIUM CHLORIDE 40 MEQ: 1500 TABLET, EXTENDED RELEASE ORAL at 14:31

## 2020-04-28 ASSESSMENT — PAIN SCALES - GENERAL
PAINLEVEL_OUTOF10: 0

## 2020-04-28 NOTE — CARE COORDINATION
Per daughter's request, referrals were faxed to multiple facilities to place her name on the waiting list after patient is discharged to the community.

## 2020-04-28 NOTE — PROGRESS NOTES
Patient assessment completed and morning medications given. Vitals are stable. Patient calm and cooperative. No concerns. Will continue to monitor. Call light within reach, bed alarm on.

## 2020-04-28 NOTE — CARE COORDINATION
Discharge Note:    Patient refuses to go to LTC and family agrees to take her home w/extra hours from [address]. They prefer [employer] for [address].     All discharge needs met by Case Management     Charlyne Schirmer RN BSN  Case Management  [phone]

## 2020-04-28 NOTE — DISCHARGE SUMMARY
Hospital Medicine Discharge Summary    Patient ID: Jackie Salcido      Patient's PCP: Marcela Calderon    Admit Date: 4/23/2020     Discharge Date:   4/28/2020     Admitting Physician: Ramila Wheeler MD     Discharge Physician: Laure Smith MD     Discharge Diagnoses: Active Hospital Problems    Diagnosis    Late onset Alzheimer's disease without behavioral disturbance ([facility] [address] [G30.1, F02.80]    Hypokalemia [E87.6]    Stroke-like symptoms [R29.90]    Dyslipidemia [E78.5]    Essential hypertension [I10]       The patient was seen and examined on day of discharge and this discharge summary is in conjunction with any daily progress note from day of discharge. Hospital Course: The patient is a 80-year-old lady with a history of dementia and problems with short-term memory over the last several months, hypertension, TIA who presented with acute confusion and vague symptoms of right-sided numbness which have since resolved. Confusion improved. Was consulted by neurology for strokelike symptoms  MRI brain showed atrophy and chronic white matter changes. TSH and B12 levels were normal.    Principal Problem:    Stroke-like symptoms: Resolved. MRI brain unremarkable for acute pathology. She did well with PT OT evaluations. Will arrange S3 rehab model with home care services. Echocardiogram normal study with EF of 70% and no wall motion abnormality  Continue antiplatelet therapy and statin  Arrange for Home health with PT OT S3  model     Active Problems:    Essential hypertension: Stable on amlodipine and hydrochlorothiazide    Dyslipidemia: On statin    Late onset Alzheimer's disease without behavioral disturbance ([facility] [address]): Was seen by neurology. Concerning for late onset dementia.     Neurology recommended outpatient Aricept and will follow-up with the patient in clinic       Hypokalemia: repleted            Physical Exam Performed:     /71   Pulse 81   Temp 96.9 °F (36.1 °C) (Temporal)   Resp 18   Ht 5' 2\" (1.575 m)   Wt 167 lb (75.8 kg)   SpO2 98%   BMI 30.54 kg/m²       General appearance:  No apparent distress, appears stated age and cooperative. HEENT:  Normal cephalic, atraumatic without obvious deformity. Pupils equal, round, and reactive to light. Extra ocular muscles intact. Conjunctivae/corneas clear. Neck: Supple, with full range of motion. No jugular venous distention. Trachea midline. Respiratory:  Normal respiratory effort. Clear to auscultation, bilaterally without Rales/Wheezes/Rhonchi. Cardiovascular:  Regular rate and rhythm with normal S1/S2 without murmurs, rubs or gallops. Abdomen: Soft, non-tender, non-distended with normal bowel sounds. Musculoskeletal:  No clubbing, cyanosis or edema bilaterally. Full range of motion without deformity. Skin: Skin color, texture, turgor normal.  No rashes or lesions. Neurologic:  Neurovascularly intact without any focal sensory/motor deficits. Cranial nerves: II-XII intact, grossly non-focal.  Psychiatric:  Alert and oriented, thought content appropriate, normal insight  Capillary Refill: Brisk,< 3 seconds   Peripheral Pulses: +2 palpable, equal bilaterally       Labs: For convenience and continuity at follow-up the following most recent labs are provided:      CBC:    Lab Results   Component Value Date    WBC 5.3 04/28/2020    HGB 13.8 04/28/2020    HCT 41.2 04/28/2020     04/28/2020       Renal:    Lab Results   Component Value Date     04/28/2020    K 3.0 04/28/2020    K 3.1 04/24/2020     04/28/2020    CO2 29 04/28/2020    BUN 16 04/28/2020    CREATININE 0.8 04/28/2020    CALCIUM 9.5 04/28/2020         Significant Diagnostic Studies    Radiology:   MRI brain without contrast   Final Result   Cerebral atrophy. Mild chronic small vessel ischemic changes. No acute   brain parenchymal abnormality. CTA HEAD NECK W CONTRAST   Final Result   Unremarkable CTA of the head and neck.          CT

## 2020-04-28 NOTE — CARE COORDINATION
Discharge planning note:    Spoke with daughter Juan M Jason ([phone]) and family wants patient to go to LTC. Explained patient will be discharged today and we may not be able to get process completed for her to go to LTC under her Medicaid today. Family is aware they may need to take her home prior to go to LTC. Received a phone call from Yoly Juarez ([phone]) @ [employer] about placement as well. Referrals sent to [facility] at [place] [address] at [facility], Vivian Quevedo. Home at [facility] is reviewing chart now (Cy Mccarthy). Messages left at the other facilities.     Denia Guillermo RN BSN  Case Management

## 2020-04-28 NOTE — PROGRESS NOTES
Okay to send patient home w/ low potassium per hospitalist. Patient received 20 meq via IV and 40 meq oral. Will recheck potassium level and send patient home with potassium if need be.

## 2020-04-28 NOTE — PROGRESS NOTES
[facility] home care referral. Spoke with pt and re: home care plan of care/services. Agreeable. Demographic's verified. Will follow for home care.

## 2020-04-29 ENCOUNTER — CARE COORDINATION (OUTPATIENT)
Dept: CARE COORDINATION | Age: 85
End: 2020-04-29

## 2020-04-29 LAB — SARS-COV-2, PCR: NOT DETECTED

## 2020-04-29 NOTE — CARE COORDINATION
Patient contacted regarding recent discharge and COVID-19 risk   Care Transition Nurse/ Ambulatory Care Manager contacted the family by telephone to perform post discharge assessment. Verified name and  with family as identifiers. Patient has following risk factors of: no known risk factors. CTN/ACM reviewed discharge instructions, medical action plan and red flags related to discharge diagnosis. Reviewed and educated them on any new and changed medications related to discharge diagnosis. Advised obtaining a 90-day supply of all daily and as-needed medications. Education provided regarding infection prevention, and signs and symptoms of COVID-19 and when to seek medical attention with family who verbalized understanding. Discussed exposure protocols and quarantine from [address] you at higher risk for severe illness  and given an opportunity for questions and concerns. The family agrees to contact the COVID-19 hotline [phone] or PCP office for questions related to their healthcare. CTN/ACM provided contact information for future reference. From CDC: Are you at higher risk for severe illness?  Wash your hands often.  Avoid close contact (6 feet, which is about two arm lengths) with people who are sick.  Put distance between yourself and other people if COVID-19 is spreading in your community.  Clean and disinfect frequently touched surfaces.  Avoid all cruise travel and non-essential air travel.  Call your healthcare professional if you have concerns about COVID-19 and your underlying condition or if you are sick. For more information on steps you can take to protect yourself, see CDC's How to R [address] answered phone, who gave phone to patient , introduced self then phone passed to patient daughter Kaley Alvares, who reports patient is doing well, symptoms leading to Ed visit resolved. Vu when to return pt to Ed/ contact PCP.  No none sick

## 2020-05-13 ENCOUNTER — CARE COORDINATION (OUTPATIENT)
Dept: CARE COORDINATION | Age: 85
End: 2020-05-13

## 2020-05-13 NOTE — CARE COORDINATION
You Patient resolved from the Care Transitions episode on 5/13/2020  Patient/family has been provided the following resources and education related to COVID-19:                         Signs, symptoms and red flags related to COVID-19            CDC exposure and quarantine guidelines            Conduit exposure contact - [phone]            Contact for their local Department of Health                 Patient currently reports that the following symptoms have improved:       No further outreach scheduled with this CTN/ACM. Episode of Care resolved. Patient has this CTN/ACM contact information if future needs arise.

## 2020-11-03 PROBLEM — I10 ESSENTIAL HYPERTENSION: Status: RESOLVED | Noted: 2020-11-03 | Resolved: 2020-11-03
